# Patient Record
Sex: FEMALE | Race: WHITE | NOT HISPANIC OR LATINO | Employment: FULL TIME | URBAN - METROPOLITAN AREA
[De-identification: names, ages, dates, MRNs, and addresses within clinical notes are randomized per-mention and may not be internally consistent; named-entity substitution may affect disease eponyms.]

---

## 2017-12-11 ENCOUNTER — OFFICE VISIT (OUTPATIENT)
Dept: ENDOCRINOLOGY | Facility: CLINIC | Age: 47
End: 2017-12-11

## 2017-12-11 VITALS
HEART RATE: 90 BPM | HEIGHT: 63 IN | WEIGHT: 157.7 LBS | BODY MASS INDEX: 27.94 KG/M2 | SYSTOLIC BLOOD PRESSURE: 137 MMHG | DIASTOLIC BLOOD PRESSURE: 92 MMHG | OXYGEN SATURATION: 100 %

## 2017-12-11 DIAGNOSIS — E66.3 OVERWEIGHT (BMI 25.0-29.9): Primary | ICD-10-CM

## 2017-12-11 RX ORDER — PHENTERMINE HYDROCHLORIDE 30 MG/1
30 CAPSULE ORAL EVERY MORNING
Qty: 30 CAPSULE | Refills: 3 | Status: SHIPPED | OUTPATIENT
Start: 2017-12-11 | End: 2024-02-28

## 2017-12-11 ASSESSMENT — ENCOUNTER SYMPTOMS
INSOMNIA: 0
WEIGHT LOSS: 0
DECREASED CONCENTRATION: 0
NIGHT SWEATS: 1
FATIGUE: 1
DECREASED APPETITE: 0
INCREASED ENERGY: 1
POLYPHAGIA: 1
FEVER: 0
NERVOUS/ANXIOUS: 0
HALLUCINATIONS: 0
CHILLS: 0
DEPRESSION: 1
ALTERED TEMPERATURE REGULATION: 0
WEIGHT GAIN: 1
POLYDIPSIA: 0
PANIC: 0

## 2017-12-11 ASSESSMENT — PAIN SCALES - GENERAL: PAINLEVEL: NO PAIN (0)

## 2017-12-11 NOTE — LETTER
Date:December 12, 2017      Patient was self referred, no letter generated. Do not send.        [facility] Health Information
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
## 2017-12-11 NOTE — NURSING NOTE
"(   Chief Complaint   Patient presents with     Consult     weight man.     )    ( Weight: 157 lb 11.2 oz )  ( Height: 5' 3\" )  ( BMI (Calculated): 27.99 )  (   )  (   )  (   )  (   )  (   )  (   )    ( BP: (!) 137/92 )  (   )  (   )  (   )  ( Pulse: 90 )  (   )  ( SpO2: 100 % )    (   Patient Active Problem List   Diagnosis     FAMILIA (generalized anxiety disorder)    )  (   Current Outpatient Prescriptions   Medication Sig Dispense Refill     FLUoxetine (PROZAC) 20 MG capsule TK ONE C PO QD TK AFTER 10 MG T       SPRINTEC 28 OR None Entered       LEVAQUIN 500 MG OR TABS ONE DAILY 14 0     EFFEXOR XR# 150 MG OR CP24 1 CAPSULE DAILY WITH FOOD       XANAX 0.5 MG OR TABS PRN       VICKEY 3-0.02 MG OR TABS 1 TABLET DAILY       PHENTERCOT 30 MG OR CAPS 1 CAPSULE EVERY MORNING 30 0    )  ( Diabetes Eval:    )    ( Pain Eval:  No Pain (0) )    ( Wound Eval:       )    (   History   Smoking Status     Never Smoker   Smokeless Tobacco     Not on file    )    ( Signed By:  Brendon Hinson; December 11, 2017; 11:27 AM )    "

## 2017-12-11 NOTE — MR AVS SNAPSHOT
After Visit Summary   2017    Aga Savage    MRN: [id number]           Patient Information     Date Of Birth          1970        Visit Information        Provider Department      2017 11:00 AM Daron Loaiza MD [facility] Medical Weight Management        Today's Diagnoses     Overweight (BMI 25.0-29.9)    -  1       Follow-ups after your visit        Follow-up notes from your care team     Return in about 3 months (around 3/11/2018).      Who to contact     Please call your clinic at [phone] to:    Ask questions about your health    Make or cancel appointments    Discuss your medicines    Learn about your test results    Speak to your doctor   If you have compliments or concerns about an experience at your clinic, or if you wish to file a complaint, please contact [facility] Patient Relations at [phone] or email us at [email or web address]         Additional Information About Your Visit        MyChart Information     [employer] is an electronic gateway that provides easy, online access to your medical records. With [employer], you can request a clinic appointment, read your test results, renew a prescription or communicate with your care team.     To sign up for [employer] visit the website at [email or web address]   You will be asked to enter the access code listed below, as well as some personal information. Please follow the directions to create your username and password.     Your access code is: [email or web address]  Expires: 2018  6:30 AM     Your access code will  in 90 days. If you need help or a new code, please contact your [facility] or call [phone] for assistance.        Care EveryWhere ID     This is your Care EveryWhere ID. This could be used by other organizations to access your [place] medical records  [id number]        Your Vitals Were     Pulse Height Pulse Oximetry  "BMI (Body Mass Index)          90 1.6 m (5' 3\") 100% 27.94 kg/m2         Blood Pressure from Last 3 Encounters:   12/11/17 (!) 137/92   03/13/09 122/70   09/05/08 124/76    Weight from Last 3 Encounters:   12/11/17 71.5 kg (157 lb 11.2 oz)   09/05/08 78 kg (172 lb)   07/22/08 82.6 kg (182 lb)              Today, you had the following     No orders found for display         Today's Medication Changes          These changes are accurate as of: 12/11/17 11:47 AM.  If you have any questions, ask your nurse or doctor.               These medicines have changed or have updated prescriptions.        Dose/Directions    * PHENTERCOT 30 MG Caps   This may have changed:  Another medication with the same name was added. Make sure you understand how and when to take each.   Used for:  Overweight (BMI 25.0-29.9)   Changed by:  Juanjo Fisher MD        1 CAPSULE EVERY MORNING   Quantity:  30   Refills:  0       * phentermine 30 MG capsule   This may have changed:  You were already taking a medication with the same name, and this prescription was added. Make sure you understand how and when to take each.   Used for:  Overweight (BMI 25.0-29.9)   Changed by:  Daron Loaiza MD        Dose:  30 mg   Take 1 capsule (30 mg) by mouth every morning   Quantity:  30 capsule   Refills:  3       * Notice:  This list has 2 medication(s) that are the same as other medications prescribed for you. Read the directions carefully, and ask your doctor or other care provider to review them with you.         Where to get your medicines      Some of these will need a paper prescription and others can be bought over the counter.  Ask your nurse if you have questions.     Bring a paper prescription for each of these medications     phentermine 30 MG capsule                Primary Care Provider    None Specified       No primary provider on file.        Equal Access to Services     ESME EID AH: lenny Baires " jj, tex vargas, jordan olvera. So [facility] [phone].    ATENCIÓN: Si diana luque, tiene a segura disposición servicios gratuitos de asistencia lingüística. Ashely al [phone].    We comply with applicable federal civil rights laws and [place] laws. We do not discriminate on the basis of race, color, national origin, age, disability, sex, sexual orientation, or gender identity.            Thank you!     Thank you for choosing [facility]ICAL WE[facility] MANAGEMENT  for your care. Our goal is always to provide you with excellent care. Hearing back from our patients is one way we can continue to improve our services. Please take a few minutes to complete the written survey that you may receive in the mail after your visit with us. Thank you!             Your Updated Medication List - Protect others around you: Learn how to safely use, store and throw away your medicines at www.disposemymeds.org.          This list is accurate as of: 12/11/17 11:47 AM.  Always use your most recent med list.                   Brand Name Dispense Instructions for use Diagnosis    EFFEXOR  MG 24 hr capsule   Generic drug:  venlafaxine      1 CAPSULE DAILY WITH FOOD        LEVAQUIN 500 MG tablet   Generic drug:  levofloxacin     14    ONE DAILY    Sinusitis acute       * PHENTERCOT 30 MG Caps     30    1 CAPSULE EVERY MORNING    Overweight (BMI 25.0-29.9)       * phentermine 30 MG capsule     30 capsule    Take 1 capsule (30 mg) by mouth every morning    Overweight (BMI 25.0-29.9)       PROZAC 20 MG capsule   Generic drug:  FLUoxetine      TK ONE C PO QD TK AFTER 10 MG T        SPRINTEC 28 PO      None Entered        XANAX 0.5 MG tablet   Generic drug:  ALPRAZolam      PRN        VICKEY 3-0.02 MG per tablet   Generic drug:  drospirenone-ethinyl estradiol      1 TABLET DAILY        * Notice:  This list has 2 medication(s) that are the same as other medications prescribed for you. Read the  directions carefully, and ask your doctor or other care provider to review them with you.
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
## 2018-06-15 ENCOUNTER — VIRTUAL VISIT (OUTPATIENT)
Dept: FAMILY MEDICINE | Facility: OTHER | Age: 48
End: 2018-06-15

## 2018-06-16 NOTE — PROGRESS NOTES
"Date: [id number]  Clinician: Armaan Ppoe  Clinician NPI: [id number]  Patient: Aga Savage  Patient : 1970  Patient Address: [address] [place] [address]  Patient Phone: [phone]  Visit Protocol: Yeast infection  Patient Summary:  Aga is a 48 year old ( : 1970 ) female who initiated a Visit for a presumed vaginal yeast infection. When asked the question \"Please sign me up to receive news, health information and promotions from [facility].\", Aga responded \"No\".    5-10 days ago, she began noticing perivulvar pruritus and vaginal discharge.   She denies having open sores, perivulvar rash, vaginal pruritus, and abdominal pain. She also denies feeling feverish.   She has had one (1) occurrence in the past year and the current symptoms are similar to previous yeast infections. She has not tried to treat her current symptoms with any medication.   She has a more than normal amount of chunky (like cottage cheese), clear or white, thick, non-odorous discharge.   She denies taking antibiotics in the past 2 weeks. She prefers a fluconazole (Diflucan) Pill.   She denies pregnancy and denies breastfeeding. She has menstruated in the past month.   She denies risk factors for sexually transmitted infections. She does NOT smoke or use smokeless tobacco.   MEDICATIONS: [{\"id\"=&gt;5829878, \"description\"=&gt;\"Xanax oral\"}, {\"id\"=&gt;2917997, \"description\"=&gt;\"Zantac oral\"}, {\"id\"=&gt;8854716, \"description\"=&gt;\"Adderall oral\"}, {\"id\"=&gt;4210754, \"description\"=&gt;\"Prozac oral\"}], ALLERGIES: [{\"id\"=&gt;3338797, \"description\"=&gt;\"Penicillins\"}, {\"id\"=&gt;8502303, \"description\"=&gt;\"Septra\"}, {\"id\"=&gt;9884328, \"description\"=&gt;\"Celexa\"}]  Clinician Response:  Dear Aga,  Based on the information you have provided, you likely have a vaginal yeast infection which is a common infection of the vagina caused by a fungus.  I am prescribing:   Fluconazole (Diflucan) 150 mg oral tablet. Take 1 tablet by " mouth 1 time per day for 1 day. There are no refills with this prescription.  While you have yeast infection symptoms, do the following:      Avoid irritants such as scented bath products, tampons, pads, or vaginal sprays and powders.    Avoid douching.    Wear cotton underwear and if you are comfortable doing so, do not wear underwear to bed.    Avoid hot tubs and whirlpool spas.     Most women notice improvement in their symptoms within 1-2 days after starting treatment with complete clearing in 5-7 days. If your symptoms have not improved in 3 days or not resolved in 10 days, please schedule an appointment to see your primary care provider for an evaluation as your symptoms may be caused by an infection other than yeast. Sometimes yeast infections can be associated with other vaginal infections or with sexually transmitted infections (STIs). If you think you may be at risk for a STI please be seen in a clinic.   Diagnosis: Candida Vulvovaginitis  Diagnosis ICD: B37.3  Prescription: fluconazole (Diflucan) 150 mg oral tablet 1 1 tablet blister pack, 1 days supply. Take 1 tablet by mouth 1 time per day for 1 day. Refills: 0, Refill as needed: no, Allow substitutions: yes  Pharmacy: [id number] - [phone] - [address] [address]

## 2018-06-17 ENCOUNTER — HOSPITAL ENCOUNTER (EMERGENCY)
Facility: CLINIC | Age: 48
Discharge: LEFT BEFORE DISCHARGE | End: 2018-06-17
Payer: COMMERCIAL

## 2018-06-17 VITALS
TEMPERATURE: 99 F | DIASTOLIC BLOOD PRESSURE: 87 MMHG | OXYGEN SATURATION: 100 % | RESPIRATION RATE: 20 BRPM | SYSTOLIC BLOOD PRESSURE: 145 MMHG | HEART RATE: 109 BPM

## 2018-06-17 NOTE — ED TRIAGE NOTES
Pt to ER with c/o needing an note to be able to go back to work, also would like her ears and throat checked

## 2018-11-28 ENCOUNTER — VIRTUAL VISIT (OUTPATIENT)
Dept: FAMILY MEDICINE | Facility: OTHER | Age: 48
End: 2018-11-28

## 2018-11-29 NOTE — PROGRESS NOTES
"Date:   Clinician: Gustavo De Leon  Clinician NPI: [id number]  Patient: Aga Savage  Patient : 1970  Patient Address: [address] [place] [address]  Patient Phone: [phone]  Visit Protocol: Yeast infection  Patient Summary:  Aga is a 48 year old ( : 1970 ) female who initiated a Visit for a presumed vaginal yeast infection. When asked the question \"Please sign me up to receive news, health information and promotions from [employer].\", Aga responded \"No\".    Aga began noticing vaginal discharge and perivulvar pruritus 0-5 days ago. She has a more than normal amount of thick, clear or white, non-odorous, smooth discharge.   She denies having abdominal pain, vaginal pruritus, perivulvar rash, and open sores. She also denies feeling feverish.   Aga has a history of vaginal yeast infections. She has had one (1) occurrence in the past year and the symptoms are NOT similar to previous yeast infections. She has used fluconazole (Diflucan) to treat previous yeast infections. 2 doses of fluconazole (Diflucan) has typically been needed for symptoms to resolve in the past.  She has not tried to treat her current symptoms with any medication.   She prefers a fluconazole (Diflucan) Pill.   She denies taking antibiotics in the past 2 weeks. She denies having a sexually transmitted disease.   She denies pregnancy and denies breastfeeding. She has menstruated in the past month.   She does NOT smoke or use smokeless tobacco.   Additional information as reported by the patient (free text): This is actually a bacterial infection. I have clear discharge and it is the same symptoms as before. May I please get the gel for a bacterial infection called in. I have a history of bv.Thank you     MEDICATIONS: Xanax oral, Zantac oral, Adderall oral, Prozac oral, ALLERGIES: Celexa, Septra, Penicillins  Clinician Response:  Dear Aga,  Based on the information you have provided, you likely have a vaginal " yeast infection which is a common infection of the vagina caused by a fungus.  I am prescribing:   Fluconazole (Diflucan) 150 mg oral tablet. Take 1 tablet by mouth in a single dose. Repeat dose in 3 days if symptoms are still present. There are no refills with this prescription.  While you have yeast infection symptoms, do the following:      Avoid irritants such as scented bath products, tampons, pads, or vaginal sprays and powders.    Avoid douching.    Wear cotton underwear and if you are comfortable doing so, do not wear underwear to bed.    Avoid hot tubs and whirlpool spas.     Symptoms should improve with 1-2 days of treatment and resolve entirely in 5-7 days. However, there are other types of vaginal infections that have some of the same symptoms as a yeast infection. For this reason, please be seen in person if symptoms have not improved after 3 days or resolved after 10 days.   Diagnosis: Candida vulvovaginitis  Diagnosis ICD: B37.3  Prescription: fluconazole (Diflucan) 150 mg oral tablet 2 tablet, 4 days supply. Take 1 tablet by mouth in a single dose, repeat dose in 3 days if symptoms are still present. Refills: 0, Refill as needed: no, Allow substitutions: yes

## 2021-05-26 ENCOUNTER — RECORDS - HEALTHEAST (OUTPATIENT)
Dept: ADMINISTRATIVE | Facility: CLINIC | Age: 51
End: 2021-05-26

## 2024-06-04 ENCOUNTER — TRANSFERRED RECORDS (OUTPATIENT)
Dept: MULTI SPECIALTY CLINIC | Facility: CLINIC | Age: 54
End: 2024-06-04

## 2024-12-23 ENCOUNTER — OFFICE VISIT (OUTPATIENT)
Dept: FAMILY MEDICINE | Facility: CLINIC | Age: 54
End: 2024-12-23
Payer: COMMERCIAL

## 2024-12-23 VITALS
OXYGEN SATURATION: 99 % | WEIGHT: 167.2 LBS | TEMPERATURE: 97.2 F | RESPIRATION RATE: 20 BRPM | HEIGHT: 64 IN | SYSTOLIC BLOOD PRESSURE: 130 MMHG | DIASTOLIC BLOOD PRESSURE: 91 MMHG | BODY MASS INDEX: 28.54 KG/M2 | HEART RATE: 92 BPM

## 2024-12-23 DIAGNOSIS — Z11.59 NEED FOR HEPATITIS C SCREENING TEST: ICD-10-CM

## 2024-12-23 DIAGNOSIS — R09.81 CHRONIC NASAL CONGESTION: ICD-10-CM

## 2024-12-23 DIAGNOSIS — Z76.89 ENCOUNTER TO ESTABLISH CARE: Primary | ICD-10-CM

## 2024-12-23 DIAGNOSIS — K13.0 ANGULAR CHEILITIS: ICD-10-CM

## 2024-12-23 DIAGNOSIS — L98.7 EXCESS SKIN OF ABDOMEN: ICD-10-CM

## 2024-12-23 DIAGNOSIS — Z13.6 CARDIOVASCULAR SCREENING; LDL GOAL LESS THAN 130: ICD-10-CM

## 2024-12-23 DIAGNOSIS — B37.0 CANDIDIASIS OF MOUTH: ICD-10-CM

## 2024-12-23 DIAGNOSIS — B49 FUNGAL INFECTION: ICD-10-CM

## 2024-12-23 DIAGNOSIS — Z11.4 SCREENING FOR HIV (HUMAN IMMUNODEFICIENCY VIRUS): ICD-10-CM

## 2024-12-23 DIAGNOSIS — Z13.29 SCREENING FOR THYROID DISORDER: ICD-10-CM

## 2024-12-23 DIAGNOSIS — K21.00 GASTROESOPHAGEAL REFLUX DISEASE WITH ESOPHAGITIS, UNSPECIFIED WHETHER HEMORRHAGE: ICD-10-CM

## 2024-12-23 DIAGNOSIS — N95.1 SYMPTOMATIC MENOPAUSAL OR FEMALE CLIMACTERIC STATES: ICD-10-CM

## 2024-12-23 DIAGNOSIS — Z79.899 MEDICATION MANAGEMENT: ICD-10-CM

## 2024-12-23 DIAGNOSIS — Z13.1 SCREENING FOR DIABETES MELLITUS: ICD-10-CM

## 2024-12-23 DIAGNOSIS — Z12.11 SCREEN FOR COLON CANCER: ICD-10-CM

## 2024-12-23 DIAGNOSIS — E61.1 IRON DEFICIENCY: ICD-10-CM

## 2024-12-23 DIAGNOSIS — L81.1 MELASMA: ICD-10-CM

## 2024-12-23 LAB
ALBUMIN SERPL BCG-MCNC: 4.2 G/DL (ref 3.5–5.2)
ALP SERPL-CCNC: 124 U/L (ref 40–150)
ALT SERPL W P-5'-P-CCNC: 18 U/L (ref 0–50)
ANION GAP SERPL CALCULATED.3IONS-SCNC: 9 MMOL/L (ref 7–15)
AST SERPL W P-5'-P-CCNC: 24 U/L (ref 0–45)
BILIRUB SERPL-MCNC: 0.2 MG/DL
BUN SERPL-MCNC: 12.3 MG/DL (ref 6–20)
CALCIUM SERPL-MCNC: 9.3 MG/DL (ref 8.8–10.4)
CHLORIDE SERPL-SCNC: 103 MMOL/L (ref 98–107)
CHOLEST SERPL-MCNC: 212 MG/DL
CREAT SERPL-MCNC: 0.86 MG/DL (ref 0.51–0.95)
EGFRCR SERPLBLD CKD-EPI 2021: 81 ML/MIN/1.73M2
ERYTHROCYTE [DISTWIDTH] IN BLOOD BY AUTOMATED COUNT: 15 % (ref 10–15)
EST. AVERAGE GLUCOSE BLD GHB EST-MCNC: 100 MG/DL
FASTING STATUS PATIENT QL REPORTED: YES
FASTING STATUS PATIENT QL REPORTED: YES
FERRITIN SERPL-MCNC: 16 NG/ML (ref 11–328)
GLUCOSE SERPL-MCNC: 99 MG/DL (ref 70–99)
HBA1C MFR BLD: 5.1 % (ref 0–5.6)
HCO3 SERPL-SCNC: 27 MMOL/L (ref 22–29)
HCT VFR BLD AUTO: 39.1 % (ref 35–47)
HCV AB SERPL QL IA: NONREACTIVE
HDLC SERPL-MCNC: 61 MG/DL
HGB BLD-MCNC: 12.5 G/DL (ref 11.7–15.7)
HIV 1+2 AB+HIV1 P24 AG SERPL QL IA: NONREACTIVE
IRON BINDING CAPACITY (ROCHE): 318 UG/DL (ref 240–430)
IRON SATN MFR SERPL: 8 % (ref 15–46)
IRON SERPL-MCNC: 26 UG/DL (ref 37–145)
LDLC SERPL CALC-MCNC: 130 MG/DL
MCH RBC QN AUTO: 28 PG (ref 26.5–33)
MCHC RBC AUTO-ENTMCNC: 32 G/DL (ref 31.5–36.5)
MCV RBC AUTO: 88 FL (ref 78–100)
NONHDLC SERPL-MCNC: 151 MG/DL
PLATELET # BLD AUTO: 327 10E3/UL (ref 150–450)
POTASSIUM SERPL-SCNC: 3.7 MMOL/L (ref 3.4–5.3)
PROT SERPL-MCNC: 7 G/DL (ref 6.4–8.3)
RBC # BLD AUTO: 4.46 10E6/UL (ref 3.8–5.2)
SODIUM SERPL-SCNC: 139 MMOL/L (ref 135–145)
TRIGL SERPL-MCNC: 104 MG/DL
TSH SERPL DL<=0.005 MIU/L-ACNC: 1.52 UIU/ML (ref 0.3–4.2)
WBC # BLD AUTO: 9.6 10E3/UL (ref 4–11)

## 2024-12-23 PROCEDURE — 99204 OFFICE O/P NEW MOD 45 MIN: CPT

## 2024-12-23 PROCEDURE — 83036 HEMOGLOBIN GLYCOSYLATED A1C: CPT

## 2024-12-23 PROCEDURE — 83550 IRON BINDING TEST: CPT

## 2024-12-23 PROCEDURE — 83540 ASSAY OF IRON: CPT

## 2024-12-23 PROCEDURE — 82728 ASSAY OF FERRITIN: CPT

## 2024-12-23 PROCEDURE — 84443 ASSAY THYROID STIM HORMONE: CPT

## 2024-12-23 PROCEDURE — 36415 COLL VENOUS BLD VENIPUNCTURE: CPT

## 2024-12-23 PROCEDURE — 80053 COMPREHEN METABOLIC PANEL: CPT

## 2024-12-23 PROCEDURE — 87389 HIV-1 AG W/HIV-1&-2 AB AG IA: CPT

## 2024-12-23 PROCEDURE — 86803 HEPATITIS C AB TEST: CPT

## 2024-12-23 PROCEDURE — 80061 LIPID PANEL: CPT

## 2024-12-23 PROCEDURE — 85027 COMPLETE CBC AUTOMATED: CPT

## 2024-12-23 RX ORDER — PIMECROLIMUS 10 MG/G
CREAM TOPICAL
COMMUNITY
Start: 2023-03-20

## 2024-12-23 RX ORDER — HYDROQUINONE 40 MG/G
CREAM TOPICAL
Qty: 30 ML | Refills: 0 | Status: SHIPPED | OUTPATIENT
Start: 2024-12-23

## 2024-12-23 RX ORDER — TRETINOIN 0.25 MG/G
CREAM TOPICAL AT BEDTIME
Qty: 45 G | Refills: 3 | Status: SHIPPED | OUTPATIENT
Start: 2024-12-23

## 2024-12-23 RX ORDER — ESTRADIOL 0.1 MG/G
2 CREAM VAGINAL
Qty: 42.5 G | Refills: 4 | Status: SHIPPED | OUTPATIENT
Start: 2024-12-23

## 2024-12-23 RX ORDER — KETOCONAZOLE 20 MG/G
CREAM TOPICAL
COMMUNITY
Start: 2023-09-25

## 2024-12-23 RX ORDER — DEXTROAMPHETAMINE SACCHARATE, AMPHETAMINE ASPARTATE, DEXTROAMPHETAMINE SULFATE AND AMPHETAMINE SULFATE 7.5; 7.5; 7.5; 7.5 MG/1; MG/1; MG/1; MG/1
30 TABLET ORAL
COMMUNITY
Start: 2024-12-06

## 2024-12-23 RX ORDER — DESVENLAFAXINE 100 MG/1
100 TABLET, EXTENDED RELEASE ORAL
COMMUNITY
Start: 2024-09-20

## 2024-12-23 RX ORDER — BUSPIRONE HYDROCHLORIDE 10 MG/1
10 TABLET ORAL
COMMUNITY
Start: 2024-10-18

## 2024-12-23 RX ORDER — ALPRAZOLAM 1 MG/1
1 TABLET ORAL
COMMUNITY
Start: 2024-10-18

## 2024-12-23 RX ORDER — ESZOPICLONE 2 MG/1
1 TABLET, FILM COATED ORAL AT BEDTIME
COMMUNITY
Start: 2024-10-20

## 2024-12-23 RX ORDER — NYSTATIN 100000 [USP'U]/G
POWDER TOPICAL 2 TIMES DAILY PRN
Qty: 60 G | Refills: 3 | Status: SHIPPED | OUTPATIENT
Start: 2024-12-23

## 2024-12-23 RX ORDER — NYSTATIN 100000 [USP'U]/ML
500000 SUSPENSION ORAL 4 TIMES DAILY
Qty: 473 ML | Refills: 0 | Status: SHIPPED | OUTPATIENT
Start: 2024-12-23

## 2024-12-23 SDOH — HEALTH STABILITY: PHYSICAL HEALTH: ON AVERAGE, HOW MANY MINUTES DO YOU ENGAGE IN EXERCISE AT THIS LEVEL?: 100 MIN

## 2024-12-23 SDOH — HEALTH STABILITY: PHYSICAL HEALTH: ON AVERAGE, HOW MANY DAYS PER WEEK DO YOU ENGAGE IN MODERATE TO STRENUOUS EXERCISE (LIKE A BRISK WALK)?: 5 DAYS

## 2024-12-23 ASSESSMENT — PAIN SCALES - GENERAL: PAINLEVEL_OUTOF10: NO PAIN (0)

## 2024-12-23 ASSESSMENT — SOCIAL DETERMINANTS OF HEALTH (SDOH): HOW OFTEN DO YOU GET TOGETHER WITH FRIENDS OR RELATIVES?: ONCE A WEEK

## 2024-12-23 NOTE — PROGRESS NOTES
Preventive Care Visit  [facility]  Cynthia Ventura DNP, Geriatric Medicine  Dec 23, 2024  {Provider  Link to [facility] :710663}    {PROVIDER CHARTING PREFERENCE:427590}    Gold Yung is a 54 year old, presenting for the following:  Establish Care and Physical        12/23/2024    11:04 AM   Additional Questions   Roomed by Marisela SWANSON MA        Via the Health Maintenance questionnaire, the patient has reported the following services have been completed -Mammogram: Ирина 2024-05-01, this information has been sent to the abstraction team.    History of Present Illness       Reason for visit:  Blood work, check tongue, check lump under arm  Symptom onset:  More than a month  Symptom intensity:  Moderate  Symptom progression:  Staying the same  Had these symptoms before:  No   She is taking medications regularly.            Health Care Directive  Patient does not have a Health Care Directive: Discussed advance care planning with patient; however, patient declined at this time.      12/23/2024   General Health   How would you rate your overall physical health? (!) FAIR   Feel stress (tense, anxious, or unable to sleep) Very much   (!) STRESS CONCERN      12/23/2024   Nutrition   Three or more servings of calcium each day? (!) NO   Diet: Regular (no restrictions)   How many servings of fruit and vegetables per day? (!) 0-1   How many sweetened beverages each day? (!) 2         12/23/2024   Exercise   Days per week of moderate/strenous exercise 5 days   Average minutes spent exercising at this level 100 min         12/23/2024   Social Factors   Frequency of gathering with friends or relatives Once a week   Worry food won't last until get money to buy more No   Food not last or not have enough money for food? No   Do you have housing? (Housing is defined as stable permanent housing and does not include staying ouside in a car, in a tent, in an abandoned building, in an overnight shelter, or  couch-surfing.) Yes   Are you worried about losing your housing? No   Lack of transportation? No   Unable to get utilities (heat,electricity)? No         12/23/2024   Fall Risk   Fallen 2 or more times in the past year? No   Trouble with walking or balance? No          12/23/2024   Dental   Dentist two times every year? (!) DECLINE         12/23/2024   TB Screening   Were you born outside of the [place]? No       {Rooming Staff Patient needs a PHQ as part of the AWV.  Use this link to complete and then refresh the note to pull results Link to PHQ2 Assessment :735830}    Today's PHQ-2 Score:       12/23/2024    11:09 AM   PHQ-2 ( 1999 [employer])   Q1: Little interest or pleasure in doing things 1   Q2: Feeling down, depressed or hopeless 1   PHQ-2 Score 2         12/23/2024   Substance Use   Alcohol more than 3/day or more than 7/wk No   Do you use any other substances recreationally? No     Social History     Tobacco Use    Smoking status: Never   Substance Use Topics    Alcohol use: Yes     Comment: rare    Drug use: No     {Provider  If there are gaps in the social history shown above, please follow the link to update and then refresh the note Link to Social and Substance History :557199}     {Mammogram Decision Support (Optional):327617}          12/23/2024   One time HIV Screening   Previous HIV test? Yes         12/23/2024   STI Screening   New sexual partner(s) since last STI/HIV test? (!) YES ***     History of abnormal Pap smear: { :770318}       ASCVD Risk   The ASCVD Risk score (Saúl HAIDER, et al., 2019) failed to calculate for the following reasons:    The systolic blood pressure is missing    Cannot find a previous HDL lab    Cannot find a previous total cholesterol lab    {Link to Fracture Risk Assessment Tool (Optional):741188}    {Provider  REQUIRED FOR AWV Use the storyboard to review patient history, after sections have been marked as reviewed, refresh note to capture documentation:877055}  "  Reviewed and updated as needed this visit by Provider     Meds                {HISTORY OPTIONS (Optional):320474}    {ROS Picklists (Optional):313367}     Objective    Exam  There were no vitals taken for this visit.   Estimated body mass index is 27.94 kg/m  as calculated from the following:    Height as of 12/11/17: 1.6 m (5' 3\").    Weight as of 12/11/17: 71.5 kg (157 lb 11.2 oz).    Physical Exam  Vitals reviewed.   Constitutional:       Appearance: Normal appearance.   HENT:      Head: Normocephalic.   Eyes:      Pupils: Pupils are equal, round, and reactive to light.   Cardiovascular:      Rate and Rhythm: Normal rate.   Pulmonary:      Effort: Pulmonary effort is normal. No respiratory distress.   Musculoskeletal:         General: Normal range of motion.   Skin:     General: Skin is warm.   Neurological:      Mental Status: She is alert and oriented to person, place, and time.   Psychiatric:         Mood and Affect: Mood normal.         Behavior: Behavior normal.         Thought Content: Thought content normal.         Judgment: Judgment normal.       {Exam Choices (Optional):717951}        Signed Electronically by: Cynthia Ventura DNP  {Email feedback regarding this note to [email or web address]   :527247}  "

## 2024-12-24 NOTE — PROGRESS NOTES
Assessment & Plan     Encounter to establish care  Looking to transfer care from outside organization    [place]  Previously worked with dermatology, wanting to try tretinoin and hydroquinone as other topicals have not been successful in treating symptoms. Discussed admin instructions, advised follow-up with derm for ongoing management  - tretinoin (RETIN-A) 0.025 % external cream; Apply topically at bedtime.  - hydroquinone (JOSE M) 4 % external cream; Apply a thin layer to the affected areas once daily at night.  - Adult Dermatology  Referral; Future    Chronic nasal congestion  Hx of chronic nasal congestion, advised trial of Flonase to see if this helps symptoms. Wanting to follow-up with ENT so will provide referral to establish care  - Adult ENT  Referral; Future    Angular cheilitis  Has tried and failed antifungals and zinc oxide, wanting to try alternative remedies. Will refer to dermatology for further management  - Adult Dermatology  Referral; Future    Excess skin of abdomen  Fungal infection  Hx of weight loss since pregnancy years ago, interested in plastic surgery referral for skin removal. Continues to get recurrent skin irritation   - nystatin (MYCOSTATIN) 218488 UNIT/GM external powder; Apply topically 2 times daily as needed for dry skin.  - Adult Plastic Surgery  Referral; Future    Gastroesophageal reflux disease with esophagitis, unspecified whether hemorrhage  Hx of ongoing reflux for many years, has tried PPI in the past with success but wanting to further evaluate prior to continuing with medication therapy  - Adult GI  Referral - Procedure Only; Future    Symptomatic menopausal or female climacteric states  Reports some vaginal irritation, will trial vaginal estrogren cream to see if this improves symptoms.  - estradiol (ESTRACE) 0.1 MG/GM vaginal cream; Place 2 g vaginally twice a week.    Iron deficiency  Reports hx of iron deficiency,  "wanting to get labs rechecked  - CBC with platelets  - Ferritin  - Iron & Iron Binding Capacity    Screen for colon cancer  Due for colon cancer screening, will provide referral  - Colonoscopy Screening  Referral; Future    Screening for HIV (human immunodeficiency virus)  - HIV Antigen Antibody Combo    Need for hepatitis C screening test  - Hepatitis C Screen Reflex to HCV RNA Quant and Genotype    Candidiasis of mouth  Not evident on physical exam but reports some recurrent/intermittent \"white\" patches on back of tongue, reports dry mouth with adderall. Can trial PO nystatin swish & spit to see if symptoms improved  - nystatin (MYCOSTATIN) 768091 UNIT/ML suspension; Take 5 mLs (500,000 Units) by mouth 4 times daily.    Screening for diabetes mellitus  - Hemoglobin A1c    Screening for thyroid disorder  - TSH with free T4 reflex    CARDIOVASCULAR SCREENING; LDL GOAL LESS THAN 130  - Lipid panel reflex to direct LDL Non-fasting    Medication management  - Comprehensive metabolic panel    Patient has been advised of split billing requirements and indicates understanding: Yes        BMI  Estimated body mass index is 29.15 kg/m  as calculated from the following:    Height as of this encounter: 1.613 m (5' 3.5\").    Weight as of this encounter: 75.8 kg (167 lb 3.2 oz).       Counseling  Appropriate preventive services were addressed with this patient via screening, questionnaire, or discussion as appropriate for fall prevention, nutrition, physical activity, Tobacco-use cessation, social engagement, weight loss and cognition.  Checklist reviewing preventive services available has been given to the patient.  Reviewed patient's diet, addressing concerns and/or questions.   She is at risk for psychosocial distress and has been provided with information to reduce risk.       FUTURE APPOINTMENTS:       - Follow-up for annual visit or as needed    Gold Yung is a 54 year old, presenting for the following " "health issues:  Establish Care and Physical        12/23/2024    11:04 AM   Additional Questions   Roomed by Marisela SWANSON MA     Here to establish care and update labs and referrals  Reports new relationship, interested in options to help with vaginal discomfort  Ongoing GERD, omeprazole has been effective in the past in managing symptoms  Has seen derm for skin concerns  Works with psychiatry for Adderall   Wanting second opinion for plastic surgery  Works as  at [email or web address]    Healthy Habits:     Taking medications regularly:  0  History of Present Illness       Reason for visit:  Blood work, check tongue, check lump under arm  Symptom onset:  More than a month  Symptom intensity:  Moderate  Symptom progression:  Staying the same  Had these symptoms before:  No   She is taking medications regularly.                 Review of Systems  Constitutional, HEENT, cardiovascular, pulmonary, gi and gu systems are negative, except as otherwise noted.      Objective    BP (!) 130/91 (BP Location: Left arm, Patient Position: Sitting, Cuff Size: Adult Regular)   Pulse 92   Temp 97.2  F (36.2  C) (Temporal)   Resp 20   Ht 1.613 m (5' 3.5\")   Wt 75.8 kg (167 lb 3.2 oz)   SpO2 99%   BMI 29.15 kg/m    Body mass index is 29.15 kg/m .  Physical Exam  Vitals reviewed.   Constitutional:       Appearance: Normal appearance.   HENT:      Head: Normocephalic.   Eyes:      Pupils: Pupils are equal, round, and reactive to light.   Cardiovascular:      Rate and Rhythm: Normal rate.   Pulmonary:      Effort: Pulmonary effort is normal. No respiratory distress.   Musculoskeletal:         General: Normal range of motion.   Skin:     General: Skin is warm.   Neurological:      Mental Status: She is alert and oriented to person, place, and time.   Psychiatric:         Mood and Affect: Mood normal.         Behavior: Behavior normal.         Thought Content: Thought content normal.         Judgment: Judgment normal.                50 " minutes spent on the date of encounter doing chart review, history and exam, documentation, and further activities per the note.          Signed Electronically by: Cynthia Ventura, DNP, APRN, CNP

## 2024-12-26 NOTE — RESULT ENCOUNTER NOTE
"Hi Aga,    Overall labs look ok. However, your iron levels are low. I would recommend starting \"Slow Fe\" iron supplement over-the-counter every other day to help reduce some common GI side effects (nausea, flatulence, GI upset). Consuming iron with vitamin C can improve absorption.     We can further discuss labs at your physical if you have any questions!  Cynthia Ventura, DNP, APRN, CNP  "

## 2024-12-27 ENCOUNTER — HOSPITAL ENCOUNTER (OUTPATIENT)
Facility: CLINIC | Age: 54
End: 2024-12-27
Attending: INTERNAL MEDICINE | Admitting: INTERNAL MEDICINE
Payer: COMMERCIAL

## 2024-12-30 NOTE — TELEPHONE ENCOUNTER
FUTURE VISIT INFORMATION      FUTURE VISIT INFORMATION:  Date: 3/10/25  Time: 9:00am  Location: [place]  REFERRAL INFORMATION:  Referring provider:  Cynthia Ventura DNP   Referring providers clinic:  [facility]  Reason for visit/diagnosis  Excess skin of abdomen     RECORDS REQUESTED FROM:       Clinic name Comments Records Status Imaging Status   [facility] Ov/referral 12/23/24 epic

## 2025-01-14 ENCOUNTER — MYC MEDICAL ADVICE (OUTPATIENT)
Dept: GASTROENTEROLOGY | Facility: CLINIC | Age: 55
End: 2025-01-14
Payer: COMMERCIAL

## 2025-01-14 NOTE — TELEPHONE ENCOUNTER
Bowel Prep Review:  Disclaimer: No call was made to the patient.     Standard Miralax bowel prep.    Recommended due to standard bowel prep.   Instructions were sent via Switchcam.     Case scheduled with Dr. Bailey at [facility]. Centralized pre assessment team is not currently handling pre assessment for cases scheduled with Dr. Bailey.  If patient calls for pre assessment or prep review, call should be directed to [facility] pre assessment line at [phone].      Kellen Singh RN  Endoscopy Procedure Pre Assessment

## 2025-01-28 ENCOUNTER — TELEPHONE (OUTPATIENT)
Dept: GASTROENTEROLOGY | Facility: CLINIC | Age: 55
End: 2025-01-28
Payer: COMMERCIAL

## 2025-01-28 NOTE — TELEPHONE ENCOUNTER
Caller: Patient    Reason for Reschedule/Cancellation (please be detailed, any staff messages or encounters to note?):   Prep made her sick. Wants to go elsewhere for procedure.     Did you cancel or rescheduled an EUS procedure? No.    Is screening questionnaire older than 3 months from the reschedule date.   If Yes, please complete screening questionnaire. No    Prior to reschedule please review:  Ordering Provider: Cynthia Ventura Determined: CS  Does patient have any ASC Exclusions, please identify?: No    Notes on Cancelled Procedure:  Procedure: Upper and Lower Endoscopy [EGD and Colonoscopy]   Date: 1/28/2025  Location: [facility]; [facility] [place] [address] [place] [address]   Surgeon: Leo    Rescheduled: No, patient will go to another facility. CTL.

## 2025-02-19 ENCOUNTER — MYC MEDICAL ADVICE (OUTPATIENT)
Dept: FAMILY MEDICINE | Facility: CLINIC | Age: 55
End: 2025-02-19
Payer: COMMERCIAL

## 2025-02-19 NOTE — TELEPHONE ENCOUNTER
Provider, please read the patient My Chart message and advise the triage staff.     Lucina Luna RN  [facility]    
Anup [place]

## 2025-03-10 ENCOUNTER — OFFICE VISIT (OUTPATIENT)
Dept: OPHTHALMOLOGY | Facility: CLINIC | Age: 55
End: 2025-03-10
Payer: COMMERCIAL

## 2025-03-10 ENCOUNTER — PRE VISIT (OUTPATIENT)
Dept: OPHTHALMOLOGY | Facility: CLINIC | Age: 55
End: 2025-03-10

## 2025-03-10 DIAGNOSIS — H02.831 DERMATOCHALASIS OF BOTH UPPER EYELIDS: Primary | ICD-10-CM

## 2025-03-10 DIAGNOSIS — H02.403 PTOSIS OF BOTH EYELIDS: ICD-10-CM

## 2025-03-10 DIAGNOSIS — H02.834 DERMATOCHALASIS OF BOTH UPPER EYELIDS: Primary | ICD-10-CM

## 2025-03-10 PROCEDURE — 92082 INTERMEDIATE VISUAL FIELD XM: CPT | Mod: GC | Performed by: OPHTHALMOLOGY

## 2025-03-10 PROCEDURE — 92285 EXTERNAL OCULAR PHOTOGRAPHY: CPT | Mod: GC | Performed by: OPHTHALMOLOGY

## 2025-03-10 PROCEDURE — 99204 OFFICE O/P NEW MOD 45 MIN: CPT | Mod: GC | Performed by: OPHTHALMOLOGY

## 2025-03-10 ASSESSMENT — TONOMETRY
OS_IOP_MMHG: 20
IOP_METHOD: ICARE
OD_IOP_MMHG: 17

## 2025-03-10 ASSESSMENT — EXTERNAL EXAM - LEFT EYE: OS_EXAM: NORMAL

## 2025-03-10 ASSESSMENT — VISUAL ACUITY
OS_CC: 20/25
OS_CC+: -3
CORRECTION_TYPE: GLASSES
OD_CC+: -2
METHOD: SNELLEN - LINEAR
OD_CC: 20/20

## 2025-03-10 ASSESSMENT — MARGIN REFLEX DISTANCE
OS_MRD1: 2
OD_MRD1: 2

## 2025-03-10 ASSESSMENT — SLIT LAMP EXAM - LIDS
COMMENTS: DERMATOCHALASIS WITH EXCESS SKIN RESTING ON LASHES, TRUE PTOSIS
COMMENTS: DERMATOCHALASIS WITH EXCESS SKIN RESTING ON LASHES, TRUE PTOSIS

## 2025-03-10 ASSESSMENT — EXTERNAL EXAM - RIGHT EYE: OD_EXAM: NORMAL

## 2025-03-10 NOTE — PROGRESS NOTES
Chief Complaints and History of Present Illnesses   Patient presents with    Droopy Eye Lid Evaluation     Here for droopy eyelids each eye      Chief Complaint(s) and History of Present Illness(es)     Droopy Eye Lid Evaluation    Associated signs and symptoms include blurred vision.  Treatments tried   include manual elevation of lid.  Pain was noted as 0/10. Additional   comments: Here for droopy eyelids each eye     Comments    Over the last year she feels both upper eyelids have been drooping  Pt states her droopy eyelids are giving her poor peripheral vision.   She feels her lids are very heavy and hooded.   She has to lift her lids with her brow to see well.  Using AT PRN for dryness each eye     Kyle Ho 9:10 AM March 10, 2025       FUNCTIONAL COMPLAINTS RELATED TO DROOPY EYELIDS/BROWS:  Aga Savage describes upper lids interfering with superior visual field and interfering with activities of daily living including reading, driving and watching television.     EXAM:     MRD1: Right eye 2 mm   Left eye 2 mm  Dermatochalasis with excess skin touching eyelashes     VISUAL FIELD:  Right eye untaped:20 degrees Right eye taped:40 degrees  Left eye untaped:25 degrees Left eye taped:40 degrees    Right eye visual field improves by: 20 degrees  Left eye visual field improves by: 15 degrees    Assessment & Plan     Aga Savage is a 54 year old female with the following diagnoses:   1. Dermatochalasis of both upper eyelids    2. Ptosis of both eyelids       POH: dry eye  PMH: no blood thinners or pacemaker  Smoking: None    Bilateral upper blepharoplasty (skin)  Bilateral upper lids ptosis repair with MMCR 8/8    The heavy upper eyelids are causing symptoms as documented above. The condition is not secondary to underlying Sjogren's, myasthenia gravis, or polymyositis.           Twyla Medellin MD  Oculoplastic Surgery Fellow    Attending Physician Attestation:  I have seen and examined this patient with the  fellow .  I have confirmed and edited as necessary the chief complaint(s), history of present illness, review of systems, relevant history, and examination findings as documented by others.  I have personally reviewed the relevant tests, images, and reports as documented above.  I have confirmed and edited as necessary the assessment and plan and agree with this note.    - Mykel Koehler MD 10:02 AM 3/10/2025    Today with Aga Savage, I reviewed the indications, risks, benefits, and alternatives of the proposed surgical procedure including, but not limited to, failure obtain the desired result  and need for additional surgery, bleeding, infection, loss of vision, loss of the eye, and the remote possibility of permanent damage to any organ system or death with the use of anesthesia.  I provided multiple opportunities for the questions, answered all questions to the best of my ability, and confirmed that my answers and my discussion were understood.   - Mykel Koehler MD 10:02 AM 3/10/2025

## 2025-03-10 NOTE — NURSING NOTE
Chief Complaints and History of Present Illnesses   Patient presents with    Droopy Eye Lid Evaluation     Here for droopy eyelids each eye      Chief Complaint(s) and History of Present Illness(es)       Droopy Eye Lid Evaluation              Associated signs and symptoms: blurred vision    Treatments tried: manual elevation of lid    Pain scale: 0/10    Comments: Here for droopy eyelids each eye               Comments    Over the last year she feels both upper eyelids have been drooping  Pt states her droopy eyelids are giving her poor peripheral vision.   She feels her lids are very heavy and hooded.   She has to lift her lids with her brow to see well.  Using AT PRN for dryness each eye     Kyle Ho 9:10 AM March 10, 2025

## 2025-03-11 ENCOUNTER — TELEPHONE (OUTPATIENT)
Dept: OPHTHALMOLOGY | Facility: CLINIC | Age: 55
End: 2025-03-11
Payer: COMMERCIAL

## 2025-03-11 NOTE — TELEPHONE ENCOUNTER
Phone call and voicemail from patient about scheduling surgery .      Alex Solis on 3/11/2025 at 10:40 AM

## 2025-03-12 PROBLEM — H02.831 DERMATOCHALASIS OF BOTH UPPER EYELIDS: Status: ACTIVE | Noted: 2025-03-10

## 2025-03-12 PROBLEM — H02.403 PTOSIS OF BOTH EYELIDS: Status: ACTIVE | Noted: 2025-03-10

## 2025-03-12 PROBLEM — H02.834 DERMATOCHALASIS OF BOTH UPPER EYELIDS: Status: ACTIVE | Noted: 2025-03-10

## 2025-03-12 NOTE — TELEPHONE ENCOUNTER
Called patient to schedule surgery with DR Koehler    Spoke with: Aga (patient)    Date(s) of Surgery: 5/7/25    Patient aware of approximate arrival time: Yes      Tissue: No Ordered: No     Location of surgery: [facility]     Pre-Op H&P: [facility] Clinic at [facility]     Informed patient that they need to call to schedule pre-op H&P within 30 days of surgery date: Yes    Post-Op Appt Dates: 5/19 at 0830     Discussed with patient pre-op RN will call 2-3 days prior to surgery with arrival time and instructions:  Yes       Standard Surgery Packet Sent: Yes 03/12/25  via Audio Network Message      Additional Information Sent in Packet:  NA     Informed patient that they will need an adult  to bring patient home from surgery: Yes  : Father         Additional Comments:  NA      All patients questions were answered and was instructed to review surgical packet and call back [phone] with any questions or concerns.       Marichuy Bray on 3/12/2025 at 10:55 AM

## 2025-03-29 ENCOUNTER — E-VISIT (OUTPATIENT)
Dept: URGENT CARE | Facility: CLINIC | Age: 55
End: 2025-03-29
Payer: COMMERCIAL

## 2025-03-29 DIAGNOSIS — K13.70 LESION OF MOUTH: Primary | ICD-10-CM

## 2025-03-29 PROCEDURE — 99207 PR NON-BILLABLE SERV PER CHARTING: CPT | Performed by: NURSE PRACTITIONER

## 2025-03-30 NOTE — PATIENT INSTRUCTIONS
Dear Aga Savage?     After reviewing your responses, I am unable to make a diagnosis that can be treated online.    You will not be charged for this eVisit.     We are dedicated to helping you achieve your best health and would like to see you in one of our many clinic locations - a primary care provider would be ideal for your concern.    Please use Paperfold to schedule a visit with a provider or call [phone] ([phone]) to schedule at any of our locations.    Thanks for choosing?us?as your health care partner,?   ?   Elsy Stephenson, CNP?

## 2025-04-29 ENCOUNTER — OFFICE VISIT (OUTPATIENT)
Dept: INTERNAL MEDICINE | Facility: CLINIC | Age: 55
End: 2025-04-29
Payer: COMMERCIAL

## 2025-04-29 VITALS
OXYGEN SATURATION: 100 % | WEIGHT: 155 LBS | RESPIRATION RATE: 19 BRPM | SYSTOLIC BLOOD PRESSURE: 160 MMHG | TEMPERATURE: 97.6 F | HEART RATE: 95 BPM | DIASTOLIC BLOOD PRESSURE: 96 MMHG | BODY MASS INDEX: 26.46 KG/M2 | HEIGHT: 64 IN

## 2025-04-29 DIAGNOSIS — Z01.818 PREOP GENERAL PHYSICAL EXAM: Primary | ICD-10-CM

## 2025-04-29 DIAGNOSIS — F90.9 ATTENTION DEFICIT HYPERACTIVITY DISORDER (ADHD), UNSPECIFIED ADHD TYPE: ICD-10-CM

## 2025-04-29 DIAGNOSIS — H02.403 PTOSIS OF BOTH EYELIDS: ICD-10-CM

## 2025-04-29 DIAGNOSIS — H02.831 DERMATOCHALASIS OF BOTH UPPER EYELIDS: ICD-10-CM

## 2025-04-29 DIAGNOSIS — F41.1 GAD (GENERALIZED ANXIETY DISORDER): ICD-10-CM

## 2025-04-29 DIAGNOSIS — H02.834 DERMATOCHALASIS OF BOTH UPPER EYELIDS: ICD-10-CM

## 2025-04-29 PROCEDURE — 3080F DIAST BP >= 90 MM HG: CPT | Performed by: PHYSICIAN ASSISTANT

## 2025-04-29 PROCEDURE — 3077F SYST BP >= 140 MM HG: CPT | Performed by: PHYSICIAN ASSISTANT

## 2025-04-29 PROCEDURE — 1126F AMNT PAIN NOTED NONE PRSNT: CPT | Performed by: PHYSICIAN ASSISTANT

## 2025-04-29 PROCEDURE — 99214 OFFICE O/P EST MOD 30 MIN: CPT | Performed by: PHYSICIAN ASSISTANT

## 2025-04-29 ASSESSMENT — PAIN SCALES - GENERAL: PAINLEVEL_OUTOF10: NO PAIN (0)

## 2025-04-29 NOTE — PROGRESS NOTES
Preoperative Evaluation  [address] [address]  Phone: [phone]  Primary Provider: Cynthia Ventura DNP  Pre-op Performing Provider: Elsy Guardado PA-C  Apr 29, 2025 4/29/2025   Surgical Information   What procedure is being done? Upper bleph and ptosis surgery   Facility or Hospital where procedure/surgery will be performed: [facility]   Who is doing the procedure / surgery? Dr. Mykel Koehler   Date of surgery / procedure: 05/07/25   Time of surgery / procedure: Na   Where do you plan to recover after surgery? at home with family     Fax number for surgical facility: Note does not need to be faxed, will be available electronically in Epic.    Assessment & Plan     The proposed surgical procedure is considered LOW risk.    Preop general physical exam      Dermatochalasis of both upper eyelids      Ptosis of both eyelids      FAMILIA (generalized anxiety disorder)      Attention deficit hyperactivity disorder (ADHD), unspecified ADHD type              - No identified additional risk factors other than previously addressed    Preoperative Medication Instructions  Antiplatelet or Anticoagulation Medication Instructions   - We reviewed the medication list and the patient is not on an antiplatelet or anticoagulation medications.    Additional Medication Instructions  Take all scheduled medications on the day of surgery EXCEPT for modifications listed below:   - Benzodiazepines: Continue without modification.   - Psychostimulants: Hold the day of surgery   - SSRIs, SNRIs, TCAs, Antipsychotics: Continue without modification.   No creams or lotions on the face   and DO not take adderall the day of surgery     Recommendation  Approval given to proceed with proposed procedure, without further diagnostic evaluation.        Gold Yung is a 55 year old, presenting for the following:  Pre-Op Exam          4/29/2025     2:47  PM   Additional Questions   Roomed by Cynthia SERNA     HPI: ptosis eye lids           4/29/2025   Pre-Op Questionnaire   Have you ever had a heart attack or stroke? No   Have you ever had surgery on your heart or blood vessels, such as a stent placement, a coronary artery bypass, or surgery on an artery in your head, neck, heart, or legs? No   Do you have chest pain with activity? No   Do you have a history of heart failure? No   Do you currently have a cold, bronchitis or symptoms of other infection? No   Do you have a cough, shortness of breath, or wheezing? No   Do you or anyone in your family have previous history of blood clots? No   Do you or does anyone in your family have a serious bleeding problem such as prolonged bleeding following surgeries or cuts? No   Have you ever had problems with anemia or been told to take iron pills? (!) YES on iron supplement   Have you had any abnormal blood loss such as black, tarry or bloody stools, or abnormal vaginal bleeding? No   Have you ever had a blood transfusion? No   Are you willing to have a blood transfusion if it is medically needed before, during, or after your surgery? Yes   Have you or any of your relatives ever had problems with anesthesia? No   Do you have sleep apnea, excessive snoring or daytime drowsiness? No   Do you have any artifical heart valves or other implanted medical devices like a pacemaker, defibrillator, or continuous glucose monitor? No   Do you have artificial joints? No   Are you allergic to latex? No     Advance Care Planning        Preoperative Review of    reviewed - controlled substances reflected in medication list.      Status of Chronic Conditions:  See problem list for active medical problems.  Problems all longstanding and stable, except as noted/documented.  See ROS for pertinent symptoms related to these conditions.    Patient Active Problem List    Diagnosis Date Noted    Dermatochalasis of both upper eyelids 03/10/2025      Priority: Medium    Ptosis of both eyelids 03/10/2025     Priority: Medium    Acne 02/15/2016     Priority: Medium    Eczema 02/15/2016     Priority: Medium    Nocturnal leg cramps 10/02/2015     Priority: Medium    Attention deficit hyperactivity disorder (ADHD) 06/14/2011     Priority: Medium    GERD (gastroesophageal reflux disease) 06/14/2011     Priority: Medium    Allergic rhinitis 03/01/2010     Priority: Medium    FAMILIA (generalized anxiety disorder)      Priority: Medium      Past Medical History:   Diagnosis Date    Depressive disorder     FAMILIA (generalized anxiety disorder)     Hypertension      No past surgical history on file.  Current Outpatient Medications   Medication Sig Dispense Refill    ALPRAZolam (XANAX) 1 MG tablet Take 1 mg by mouth nightly as needed for sleep.      amphetamine-dextroamphetamine (ADDERALL) 30 MG tablet Take 30 mg by mouth.      busPIRone (BUSPAR) 10 MG tablet Take 10 mg by mouth.      desvenlafaxine (PRISTIQ) 100 MG 24 hr tablet Take 100 mg by mouth.      estradiol (ESTRACE) 0.1 MG/GM vaginal cream Place 2 g vaginally twice a week. 42.5 g 4    hydroquinone (JOSE M) 4 % external cream Apply a thin layer to the affected areas once daily at night. 30 mL 0    ketoconazole (NIZORAL) 2 % external cream APPLY TO CORNERS OF THE MOUTH TWICE DAILY AS NEEDED FOR RASH      nystatin (MYCOSTATIN) 733383 UNIT/GM external powder Apply topically 2 times daily as needed for dry skin. 60 g 3    nystatin (MYCOSTATIN) 270573 UNIT/ML suspension Take 5 mLs (500,000 Units) by mouth 4 times daily. 473 mL 0    pimecrolimus (ELIDEL) 1 % external cream APPLY SMALL AMOUNT TO THE AFFECTED AREA TWICE DAILY      tretinoin (RETIN-A) 0.025 % external cream Apply topically at bedtime. 45 g 3       Allergies   Allergen Reactions    Penicillins Hives     Hospitalized due to severe rxn.    Sulfamethoxazole-Trimethoprim Shortness Of Breath    Bactrim      Throat closed up    Citalopram Hives    Citalopram  "Hydrobromide Rash        Social History     Tobacco Use    Smoking status: Never    Smokeless tobacco: Never   Substance Use Topics    Alcohol use: Yes     Comment: Very rarely       History   Drug Use No             Review of Systems  Constitutional, HEENT, cardiovascular, pulmonary, gi and gu systems are negative, except as otherwise noted.    Objective    BP (!) 160/96   Pulse 95   Temp 97.6  F (36.4  C) (Oral)   Resp 19   Ht 1.613 m (5' 3.5\")   Wt 70.3 kg (155 lb)   LMP 01/15/2025 (Approximate)   SpO2 100%   BMI 27.03 kg/m     Estimated body mass index is 27.03 kg/m  as calculated from the following:    Height as of this encounter: 1.613 m (5' 3.5\").    Weight as of this encounter: 70.3 kg (155 lb).  Physical Exam  Patient a bit stressed today and just took adderall a hour before appointment   GENERAL: alert and no distress  HENT: normal cephalic/atraumatic and ear canals and TM's normal  NECK: no adenopathy, no asymmetry, masses, or scars  RESP: lungs clear to auscultation - no rales, rhonchi or wheezes  CV: regular rates and rhythm and normal S1 S2, no S3 or S4  MS: no gross musculoskeletal defects noted, no edema    Recent Labs   Lab Test 12/23/24  1210   HGB 12.5         POTASSIUM 3.7   CR 0.86   A1C 5.1        Diagnostics  No labs were ordered during this visit.   No EKG required, no history of coronary heart disease, significant arrhythmia, peripheral arterial disease or other structural heart disease.    Revised Cardiac Risk Index (RCRI)  The patient has the following serious cardiovascular risks for perioperative complications:   - No serious cardiac risks = 0 points     RCRI Interpretation: 0 points: Class I (very low risk - 0.4% complication rate)         Signed Electronically by: Elsy Guardado PA-C  A copy of this evaluation report is provided to the requesting physician.         "

## 2025-04-29 NOTE — PATIENT INSTRUCTIONS
How to Take Your Medication Before Surgery  Preoperative Medication Instructions   Antiplatelet or Anticoagulation Medication Instructions   - We reviewed the medication list and the patient is not on an antiplatelet or anticoagulation medications.    Additional Medication Instructions  Take all scheduled medications on the day of surgery EXCEPT for modifications listed below:   - Benzodiazepines: Continue without modification.   - Psychostimulants: Hold the day of surgery   - SSRIs, SNRIs, TCAs, Antipsychotics: Continue without modification.   No creams or lotions on the face   and DO not take adderall the day of surgery        Patient Education   Preparing for Your Surgery  For Adults  Getting started  In most cases, a nurse will call to review your health history and instructions. They will give you an arrival time based on your scheduled surgery time. Please be ready to share:  Your doctor's clinic name and phone number  Your medical, surgical, and anesthesia history  A list of allergies and sensitivities  A list of medicines, including herbal treatments and over-the-counter drugs  Whether the patient has a legal guardian (ask how to send us the papers in advance)  Note: You may not receive a call if you were seen at our PAC (Preoperative Assessment Center).  Please tell us if you're pregnant--or if there's any chance you might be pregnant. Some surgeries may injure a fetus (unborn baby), so they require a pregnancy test. Surgeries that are safe for a fetus don't always need a test, and you can choose whether to have one.   Preparing for surgery  Within 10 to 30 days of surgery: Have a pre-op exam (sometimes called an H&P, or History and Physical). This can be done at a clinic or pre-operative center.  If you're having a , you may not need this exam. Talk to your care team.  At your pre-op exam, talk to your care team about all medicines you take. (This includes CBD oil and any drugs, such as THC,  marijuana, and other forms of cannabis.) If you need to stop any medicine before surgery, ask when to start taking it again.  This is for your safety. Many medicines and drugs can make you bleed too much during surgery. Some change how well surgery (anesthesia) drugs work.  Call your insurance company to let them know you're having surgery. (If you don't have insurance, call [phone].)  Call your clinic if there's any change in your health. This includes a scrape or scratch near the surgery site, or any signs of a cold (sore throat, runny nose, cough, rash, fever).  Eating and drinking guidelines  For your safety: Unless your surgeon tells you otherwise, follow the guidelines below.  Eat and drink as normal until 8 hours before you arrive for surgery. After that, no food or milk. You can spit out gum when you arrive.  Drink clear liquids until 2 hours before you arrive. These are liquids you can see through, like water, Gatorade, and Propel Water. They also include plain black coffee and tea (no cream or milk).  No alcohol for 24 hours before you arrive. The night before surgery, stop any drinks that contain THC.  If your care team tells you to take medicine on the morning of surgery, it's okay to take it with a sip of water. No other medicines or drugs are allowed (including CBD oil)--follow your care team's instructions.  If you have questions the day of surgery, call your hospital or surgery center.   Preventing infection  Shower or bathe the night before and the morning of surgery. Follow the instructions your clinic gave you. (If no instructions, use regular soap.)  Don't shave or clip hair near your surgery site. We'll remove the hair if needed.  Don't smoke or vape the morning of surgery. No chewing tobacco for 6 hours before you arrive. A nicotine patch is okay. You may spit out nicotine gum when you arrive.  For some surgeries, the surgeon will tell you to fully quit smoking and nicotine.  We will make  every effort to keep you safe from infection. We will:  Clean our hands often with soap and water (or an alcohol-based hand rub).  Clean the skin at your surgery site with a special soap that kills germs.  Give you a special gown to keep you warm. (Cold raises the risk of infection.)  Wear hair covers, masks, gowns, and gloves during surgery.  Give antibiotic medicine, if prescribed. Not all surgeries need this medicine.  What to bring on the day of surgery  Photo ID and insurance card  Copy of your health care directive, if you have one  Glasses and hearing aids (bring cases)  You can't wear contacts during surgery  Inhaler and eye drops, if you use them (tell us about these when you arrive)  CPAP machine or breathing device, if you use them  A few personal items, if spending the night  If you have . . .  A pacemaker, ICD (cardiac defibrillator), or other implant: Bring the ID card.  An implanted stimulator: Bring the remote control.  A legal guardian: Bring a copy of the certified (court-stamped) guardianship papers.  Please remove any jewelry, including body piercings. Leave jewelry and other valuables at home.  If you're going home the day of surgery  You must have a responsible adult drive you home. They should stay with you overnight as well.  If you don't have someone to stay with you, and you aren't safe to go home alone, we may keep you overnight. Insurance often won't pay for this.  After surgery  If it's hard to control your pain or you need more pain medicine, please call your surgeon's office.  Questions?   If you have any questions for your care team, list them here:   ____________________________________________________________________________________________________________________________________________________________________________________________________________________________________________________________  For informational purposes only. Not to replace the advice of your health care  provider. Copyright   2003, 2019 [facility]. All rights reserved. Clinically reviewed by Jay Mortensen MD. [employer] [id number] - REV 08/24.

## 2025-05-06 ENCOUNTER — ANESTHESIA EVENT (OUTPATIENT)
Dept: SURGERY | Facility: AMBULATORY SURGERY CENTER | Age: 55
End: 2025-05-06
Payer: COMMERCIAL

## 2025-05-06 NOTE — ANESTHESIA PREPROCEDURE EVALUATION
"Anesthesia Pre-Procedure Evaluation    Patient: Aga Savage   MRN: [id number] : 1970        Procedure : Procedure(s):  REPAIR, PTOSIS, BILATERAL, WITH BILATERAL UPPER BLEPHAROPLASTY          Past Medical History:   Diagnosis Date    Depressive disorder     FAMILIA (generalized anxiety disorder)     Hypertension       No past surgical history on file.   Allergies   Allergen Reactions    Penicillins Hives     Hospitalized due to severe rxn.    Sulfamethoxazole-Trimethoprim Shortness Of Breath    Bactrim      Throat closed up    Citalopram Hives    Citalopram Hydrobromide Rash      Social History     Tobacco Use    Smoking status: Never    Smokeless tobacco: Never   Substance Use Topics    Alcohol use: Yes     Comment: Very rarely      Wt Readings from Last 1 Encounters:   25 70.3 kg (155 lb)           Physical Exam    Airway        Mallampati: II   TM distance: > 3 FB   Neck ROM: full   Mouth opening: > 3 cm    Respiratory Devices and Support         Dental       (+) Minor Abnormalities - some fillings, tiny chips      Cardiovascular   cardiovascular exam normal          Pulmonary   pulmonary exam normal                OUTSIDE LABS:  CBC:   Lab Results   Component Value Date    WBC 9.6 2024    WBC 14.0 (H) 2009    HGB 12.5 2024    HGB 13.8 2009    HCT 39.1 2024    HCT 40.0 2009     2024     2009     BMP:   Lab Results   Component Value Date     2024    POTASSIUM 3.7 2024    CHLORIDE 103 2024    CO2 27 2024    BUN 12.3 2024    CR 0.86 2024    GLC 99 2024     COAGS: No results found for: \"PTT\", \"INR\", \"FIBR\"  POC:   Lab Results   Component Value Date    HCG Negative 2010     HEPATIC:   Lab Results   Component Value Date    ALBUMIN 4.2 2024    PROTTOTAL 7.0 2024    ALT 18 2024    AST 24 2024    ALKPHOS 124 2024    BILITOTAL 0.2 2024     OTHER:   Lab " "Results   Component Value Date    A1C 5.1 12/23/2024    ZEB 9.3 12/23/2024    TSH 1.52 12/23/2024       Anesthesia Plan    ASA Status:  2    NPO Status:  NPO Appropriate          Maintenance: TIVA.        Consents    Anesthesia Plan(s) and associated risks, benefits, and realistic alternatives discussed. Questions answered and patient/representative(s) expressed understanding.     - Discussed:     - Discussed with:  Patient            Postoperative Care       PONV prophylaxis: Dexamethasone or Solumedrol, Ondansetron (or other 5HT-3), Background Propofol Infusion     Comments:               Da Richard MD    Clinically Significant Risk Factors Present on Admission                             # Overweight: Estimated body mass index is 27.03 kg/m  as calculated from the following:    Height as of 4/29/25: 1.613 m (5' 3.5\").    Weight as of 4/29/25: 70.3 kg (155 lb).                "

## 2025-05-07 ENCOUNTER — HOSPITAL ENCOUNTER (OUTPATIENT)
Facility: AMBULATORY SURGERY CENTER | Age: 55
Discharge: HOME | End: 2025-05-07
Attending: OPHTHALMOLOGY
Payer: COMMERCIAL

## 2025-05-07 ENCOUNTER — ANESTHESIA (OUTPATIENT)
Dept: SURGERY | Facility: AMBULATORY SURGERY CENTER | Age: 55
End: 2025-05-07
Payer: COMMERCIAL

## 2025-05-07 VITALS
WEIGHT: 155 LBS | RESPIRATION RATE: 18 BRPM | HEIGHT: 64 IN | SYSTOLIC BLOOD PRESSURE: 209 MMHG | TEMPERATURE: 97.8 F | DIASTOLIC BLOOD PRESSURE: 110 MMHG | HEART RATE: 76 BPM | OXYGEN SATURATION: 99 % | BODY MASS INDEX: 26.46 KG/M2

## 2025-05-07 DIAGNOSIS — Z98.890 POSTOPERATIVE EYE STATE: Primary | ICD-10-CM

## 2025-05-07 RX ORDER — ERYTHROMYCIN 5 MG/G
OINTMENT OPHTHALMIC
Qty: 3.5 G | Refills: 1 | Status: SHIPPED | OUTPATIENT
Start: 2025-05-07

## 2025-05-07 RX ORDER — DEXAMETHASONE SODIUM PHOSPHATE 10 MG/ML
4 INJECTION, SOLUTION INTRAMUSCULAR; INTRAVENOUS
Status: DISCONTINUED | OUTPATIENT
Start: 2025-05-07 | End: 2025-05-08 | Stop reason: HOSPADM

## 2025-05-07 RX ORDER — OXYCODONE HYDROCHLORIDE 5 MG/1
5 TABLET ORAL
Status: COMPLETED | OUTPATIENT
Start: 2025-05-07 | End: 2025-05-07

## 2025-05-07 RX ORDER — LABETALOL HYDROCHLORIDE 5 MG/ML
10 INJECTION, SOLUTION INTRAVENOUS
Status: DISCONTINUED | OUTPATIENT
Start: 2025-05-07 | End: 2025-05-08 | Stop reason: HOSPADM

## 2025-05-07 RX ORDER — KETAMINE HYDROCHLORIDE 10 MG/ML
INJECTION INTRAMUSCULAR; INTRAVENOUS PRN
Status: DISCONTINUED | OUTPATIENT
Start: 2025-05-07 | End: 2025-05-07

## 2025-05-07 RX ORDER — ONDANSETRON 4 MG/1
4 TABLET, ORALLY DISINTEGRATING ORAL EVERY 30 MIN PRN
Status: DISCONTINUED | OUTPATIENT
Start: 2025-05-07 | End: 2025-05-08 | Stop reason: HOSPADM

## 2025-05-07 RX ORDER — TETRACAINE HYDROCHLORIDE 5 MG/ML
SOLUTION OPHTHALMIC PRN
Status: DISCONTINUED | OUTPATIENT
Start: 2025-05-07 | End: 2025-05-07 | Stop reason: HOSPADM

## 2025-05-07 RX ORDER — SODIUM CHLORIDE, SODIUM LACTATE, POTASSIUM CHLORIDE, CALCIUM CHLORIDE 600; 310; 30; 20 MG/100ML; MG/100ML; MG/100ML; MG/100ML
INJECTION, SOLUTION INTRAVENOUS CONTINUOUS PRN
Status: DISCONTINUED | OUTPATIENT
Start: 2025-05-07 | End: 2025-05-07

## 2025-05-07 RX ORDER — NALOXONE HYDROCHLORIDE 0.4 MG/ML
0.1 INJECTION, SOLUTION INTRAMUSCULAR; INTRAVENOUS; SUBCUTANEOUS
Status: DISCONTINUED | OUTPATIENT
Start: 2025-05-07 | End: 2025-05-08 | Stop reason: HOSPADM

## 2025-05-07 RX ORDER — NEOMYCIN POLYMYXIN B SULFATES AND DEXAMETHASONE 3.5; 10000; 1 MG/ML; [USP'U]/ML; MG/ML
SUSPENSION/ DROPS OPHTHALMIC
Qty: 5 ML | Refills: 0 | Status: SHIPPED | OUTPATIENT
Start: 2025-05-07

## 2025-05-07 RX ORDER — ACETAMINOPHEN 325 MG/1
975 TABLET ORAL ONCE
Status: COMPLETED | OUTPATIENT
Start: 2025-05-07 | End: 2025-05-07

## 2025-05-07 RX ORDER — ERYTHROMYCIN 5 MG/G
OINTMENT OPHTHALMIC PRN
Status: DISCONTINUED | OUTPATIENT
Start: 2025-05-07 | End: 2025-05-07 | Stop reason: HOSPADM

## 2025-05-07 RX ORDER — LIDOCAINE 40 MG/G
CREAM TOPICAL
Status: DISCONTINUED | OUTPATIENT
Start: 2025-05-07 | End: 2025-05-08 | Stop reason: HOSPADM

## 2025-05-07 RX ORDER — LIDOCAINE HYDROCHLORIDE 20 MG/ML
INJECTION, SOLUTION INFILTRATION; PERINEURAL PRN
Status: DISCONTINUED | OUTPATIENT
Start: 2025-05-07 | End: 2025-05-07

## 2025-05-07 RX ORDER — SODIUM CHLORIDE, SODIUM LACTATE, POTASSIUM CHLORIDE, CALCIUM CHLORIDE 600; 310; 30; 20 MG/100ML; MG/100ML; MG/100ML; MG/100ML
INJECTION, SOLUTION INTRAVENOUS CONTINUOUS
Status: DISCONTINUED | OUTPATIENT
Start: 2025-05-07 | End: 2025-05-08 | Stop reason: HOSPADM

## 2025-05-07 RX ORDER — PROPOFOL 10 MG/ML
INJECTION, EMULSION INTRAVENOUS PRN
Status: DISCONTINUED | OUTPATIENT
Start: 2025-05-07 | End: 2025-05-07

## 2025-05-07 RX ORDER — HYDROMORPHONE HYDROCHLORIDE 1 MG/ML
0.4 INJECTION, SOLUTION INTRAMUSCULAR; INTRAVENOUS; SUBCUTANEOUS EVERY 5 MIN PRN
Status: DISCONTINUED | OUTPATIENT
Start: 2025-05-07 | End: 2025-05-08 | Stop reason: HOSPADM

## 2025-05-07 RX ORDER — ONDANSETRON 2 MG/ML
4 INJECTION INTRAMUSCULAR; INTRAVENOUS EVERY 30 MIN PRN
Status: DISCONTINUED | OUTPATIENT
Start: 2025-05-07 | End: 2025-05-08 | Stop reason: HOSPADM

## 2025-05-07 RX ORDER — HYDROMORPHONE HYDROCHLORIDE 1 MG/ML
0.2 INJECTION, SOLUTION INTRAMUSCULAR; INTRAVENOUS; SUBCUTANEOUS EVERY 5 MIN PRN
Status: DISCONTINUED | OUTPATIENT
Start: 2025-05-07 | End: 2025-05-08 | Stop reason: HOSPADM

## 2025-05-07 RX ORDER — OXYCODONE HYDROCHLORIDE 5 MG/1
10 TABLET ORAL
Status: DISCONTINUED | OUTPATIENT
Start: 2025-05-07 | End: 2025-05-08 | Stop reason: HOSPADM

## 2025-05-07 RX ORDER — FENTANYL CITRATE 50 UG/ML
25 INJECTION, SOLUTION INTRAMUSCULAR; INTRAVENOUS EVERY 5 MIN PRN
Status: DISCONTINUED | OUTPATIENT
Start: 2025-05-07 | End: 2025-05-08 | Stop reason: HOSPADM

## 2025-05-07 RX ORDER — FENTANYL CITRATE 50 UG/ML
50 INJECTION, SOLUTION INTRAMUSCULAR; INTRAVENOUS EVERY 5 MIN PRN
Status: DISCONTINUED | OUTPATIENT
Start: 2025-05-07 | End: 2025-05-08 | Stop reason: HOSPADM

## 2025-05-07 RX ORDER — OXYCODONE HYDROCHLORIDE 5 MG/1
5 TABLET ORAL EVERY 6 HOURS PRN
Qty: 12 TABLET | Refills: 0 | Status: SHIPPED | OUTPATIENT
Start: 2025-05-07 | End: 2025-05-10

## 2025-05-07 RX ORDER — LIDOCAINE HYDROCHLORIDE AND EPINEPHRINE 10; 10 MG/ML; UG/ML
INJECTION, SOLUTION INFILTRATION; PERINEURAL PRN
Status: DISCONTINUED | OUTPATIENT
Start: 2025-05-07 | End: 2025-05-07 | Stop reason: HOSPADM

## 2025-05-07 RX ADMIN — LIDOCAINE HYDROCHLORIDE 70 MG: 20 INJECTION, SOLUTION INFILTRATION; PERINEURAL at 08:05

## 2025-05-07 RX ADMIN — PROPOFOL 20 MG: 10 INJECTION, EMULSION INTRAVENOUS at 08:08

## 2025-05-07 RX ADMIN — ACETAMINOPHEN 975 MG: 325 TABLET ORAL at 06:56

## 2025-05-07 RX ADMIN — PROPOFOL 20 MG: 10 INJECTION, EMULSION INTRAVENOUS at 08:07

## 2025-05-07 RX ADMIN — KETAMINE HYDROCHLORIDE 20 MG: 10 INJECTION INTRAMUSCULAR; INTRAVENOUS at 08:15

## 2025-05-07 RX ADMIN — PROPOFOL 30 MG: 10 INJECTION, EMULSION INTRAVENOUS at 08:10

## 2025-05-07 RX ADMIN — PROPOFOL 30 MG: 10 INJECTION, EMULSION INTRAVENOUS at 08:06

## 2025-05-07 RX ADMIN — SODIUM CHLORIDE, SODIUM LACTATE, POTASSIUM CHLORIDE, CALCIUM CHLORIDE: 600; 310; 30; 20 INJECTION, SOLUTION INTRAVENOUS at 07:51

## 2025-05-07 RX ADMIN — SODIUM CHLORIDE, SODIUM LACTATE, POTASSIUM CHLORIDE, CALCIUM CHLORIDE: 600; 310; 30; 20 INJECTION, SOLUTION INTRAVENOUS at 07:00

## 2025-05-07 RX ADMIN — OXYCODONE HYDROCHLORIDE 5 MG: 5 TABLET ORAL at 08:56

## 2025-05-07 NOTE — BRIEF OP NOTE
[facility] Brief Operative Note    Pre-operative diagnosis: Dermatochalasis of both upper eyelids [H02.831, H02.834]  Ptosis of both eyelids [H02.403]   Post-operative diagnosis Same as above   Procedure: Procedure(s):  REPAIR, PTOSIS, BILATERAL, WITH BILATERAL UPPER BLEPHAROPLASTY   Surgeon(s): Surgeons and Role:     * Mykel Koehler MD - Primary     * Nghia Thomas MD - Resident - Assisting     * Twyla Medellin MD - Fellow - Assisting   Estimated blood loss: * No values recorded between 5/7/2025  8:16 AM and 5/7/2025  8:35 AM *    Specimens: * No specimens in log *   Findings: As expected

## 2025-05-07 NOTE — ANESTHESIA POSTPROCEDURE EVALUATION
Patient: Aga Savage    Procedure: Procedure(s):  REPAIR, PTOSIS, BILATERAL, WITH BILATERAL UPPER BLEPHAROPLASTY       Anesthesia Type:  MAC    Note:  Disposition: Outpatient   Postop Pain Control: Uneventful            Sign Out: Well controlled pain   PONV: No   Neuro/Psych: Uneventful            Sign Out: Acceptable/Baseline neuro status   Airway/Respiratory: Uneventful            Sign Out: Acceptable/Baseline resp. status   CV/Hemodynamics: Uneventful            Sign Out: Acceptable CV status; No obvious hypovolemia; No obvious fluid overload   Other NRE:    DID A NON-ROUTINE EVENT OCCUR?            Last vitals:  Vitals Value Taken Time   /107 05/07/25 09:41   Temp 36.6  C (97.8  F) 05/07/25 09:40   Pulse 55 05/07/25 09:41   Resp 18 05/07/25 09:40   SpO2 100 % 05/07/25 09:43   Vitals shown include unfiled device data.    Electronically Signed By: Da Richard MD  May 7, 2025  10:55 AM

## 2025-05-07 NOTE — ANESTHESIA CARE TRANSFER NOTE
Patient: Aga Savage    Procedure: Procedure(s):  REPAIR, PTOSIS, BILATERAL, WITH BILATERAL UPPER BLEPHAROPLASTY       Diagnosis: Dermatochalasis of both upper eyelids [H02.831, H02.834]  Ptosis of both eyelids [H02.403]  Diagnosis Additional Information: No value filed.    Anesthesia Type:   MAC     Note:    Oropharynx: oropharynx clear of all foreign objects and spontaneously breathing  Level of Consciousness: awake  Oxygen Supplementation: room air    Independent Airway: airway patency satisfactory and stable  Dentition: dentition unchanged  Vital Signs Stable: post-procedure vital signs reviewed and stable  Report to RN Given: handoff report given  Patient transferred to: Phase II    Handoff Report: Identifed the Patient, Identified the Reponsible Provider, Reviewed the pertinent medical history, Discussed the surgical course, Reviewed Intra-OP anesthesia mangement and issues during anesthesia, Set expectations for post-procedure period and Allowed opportunity for questions and acknowledgement of understanding      Vitals:  Vitals Value Taken Time   /84 05/07/25 08:41   Temp 36.2  C (97.2  F) 05/07/25 08:41   Pulse 78 05/07/25 08:41   Resp 16 05/07/25 08:41   SpO2 98 % 05/07/25 08:46   Vitals shown include unfiled device data.    Electronically Signed By: ANA Cooper CRNA  May 7, 2025  8:47 AM

## 2025-05-07 NOTE — DISCHARGE INSTRUCTIONS
Post-operative Instructions    Ophthalmic Plastic and Reconstructive Surgery  Mykel Koehler M.D.  Twyla Medellin M.D.    All instructions apply to the operated eye(s) or eyelid(s)      What to expect after surgery:  There will be some swelling, bruising, and likely a black eye (even into the lower eyelids and cheeks). Also expect crusting and discharge from the eye and/or incisions.   A small amount of surface bleeding is normal for the first 48 hours after surgery.  You may notice some bloody tears for the first few days after surgery. This is normal.  Your eye(s) and eyelid(s) may be painful and tender. This is normal after surgery. Use the pain medication as prescribed. If your pain does not improve despite the medication, contact the office.    Wound care and personal care:  Apply ice compresses 15 minutes on 15 minutes off while awake for the first 2 days after surgery, then switch to warm compresses 4 times a day until seen by your physician.   For warm packs you can place a cup of dry uncooked rice in a clean cotton sock. Place sock in microwave 30 seconds to one minute. Next place the warm sock into a plastic bag and wrap the bag with clean warm wet washcloth and place over operated eye.    You may shower or wash your hair the day after surgery. Do not bathe or go swimming for 1 week to prevent contamination of your wounds.  Do not apply make-up to the eyes or eyelids for 2 weeks after surgery.    Activity restrictions and driving:  Avoid heavy lifting, bending, exercise or strenuous activity for 1 week after surgery.  You may resume other activities and return to work as tolerated.  You may not resume driving until have you stopped using narcotic pain medications(such as Norco, Percocet, Tylenol #3).    Medications:  Restart all your regular home medications and eye drops today. If you take Plavix or Aspirin on a regular basis, wait for 3 days after your surgery before restarting these in order to  decrease the risk of bleeding complications.  Avoid aspirin and aspirin-like medications (Motrin, Aleve, Ibuprofen, [place] etc) for 5 days to reduce the risk of bleeding. You may take Tylenol (acetaminophen) for pain.  In addition to your home medications, take the following post-operative medications as prescribed by your physician:  Apply antibiotic ointment (erythromycin) to all sutures three times a day, and into the operated eye(s) at night.   Instill eye drops (Maxitrol) four times a day until the bottle finished.   Take scheduled extra strength Tylenol for pain.  You may take 1 to 2 pain pills (norco or oxycodone as prescribed) as needed for breakthrough pain up to every 6 hours.  The pain pills may make you drowsy. You must not drive a car, operate heavy machinery or drink alcohol while taking them.  The pain pills may cause constipation and nausea. Take them with some food to prevent a stomach upset. If you continue to experience nausea, call your physician.    WARNING: All the prescription pain medications listed above contain Tylenol (acetaminophen). You must not take more than 4,000 mg of acetaminophen per 24-hour period. This is equivalent to 6 tablets of Darvocet, 8 tablets of Vicodin, or 12 tablets of Norco, Percocet or Tylenol #3. If you take other over-the-counter medications containing acetaminophen, you must take the amount of acetaminophen into account and reduce the number of prescribed pain pills accordingly.    Contact information and follow-up:  Return to the Eye Clinic for a follow-up appointment with your physician as scheduled. If no appointment has been scheduled, call [phone] for an appointment with Dr. Koehler within 1 to 2 weeks from your date of surgery.    For severe pain, bleeding, or loss of vision, call the Eye Clinic at [phone].  After hours or on weekends and holidays, call [phone] and ask to speak with the ophthalmologist on call.  [facility]  Surgery and Procedure [facility]  Home Care Following Anesthesia  For 24 hours after surgery:  Get plenty of rest.  A responsible adult must stay with you for at least 24 hours after you leave the surgery center.  Do not drive or use heavy equipment.  If you have weakness or tingling, don't drive or use heavy equipment until this feeling goes away.   Do not drink alcohol.   Avoid strenuous or risky activities.  Ask for help when climbing stairs.  You may feel lightheaded.  IF so, sit for a few minutes before standing.  Have someone help you get up.   If you have nausea (feel sick to your stomach): Drink only clear liquids such as apple juice, ginger ale, broth or 7-Up.  Rest may also help.  Be sure to drink enough fluids.  Move to a regular diet as you feel able.   You may have a slight fever.  Call the doctor if your fever is over 100 F (37.7 C) (taken under the tongue) or lasts longer than 24 hours.  You may have a dry mouth, a sore throat, muscle aches or trouble sleeping. These should go away after 24 hours.  Do not make important or legal decisions.   It is recommended to avoid smoking.               Tips for taking pain medications  To get the best pain relief possible, remember these points:  Take pain medications as directed, before pain becomes severe.  Pain medication can upset your stomach: taking it with food may help.  Constipation is a common side effect of pain medication. Drink plenty of  fluids.  Eat foods high in fiber. Take a stool softener if recommended by your doctor or pharmacist.  Do not drink alcohol, drive or operate machinery while taking pain medications.  Ask about other ways to control pain, such as with heat, ice or relaxation.    Tylenol/Acetaminophen Consumption    If you feel your pain relief is insufficient, you may take Tylenol/Acetaminophen in addition to your narcotic pain medication.   Be careful not to exceed 4,000 mg of Tylenol/Acetaminophen in a 24 hour period from all  sources.  If you are taking extra strength Tylenol/acetaminophen (500 mg), the maximum dose is 8 tablets in 24 hours.  If you are taking regular strength acetaminophen (325 mg), the maximum dose is 12 tablets in 24 hours.  Last dose of Tylenol received at 0700am, 975mg. May have next dose after 130pm.     Call a doctor for any of the following:  Signs of infection (fever, growing tenderness at the surgery site, a large amount of drainage or bleeding, severe pain, foul-smelling drainage, redness, swelling).  It has been over 8 to 10 hours since surgery and you are still not able to urinate (pass water).  Headache for over 24 hours.  Numbness, tingling or weakness the day after surgery (if you had spinal anesthesia).  Signs of Covid-19 infection (temperature over 100 degrees, shortness of breath, cough, loss of taste/smell, generalized body aches, persistent headache, chills, sore throat, nausea/vomiting/diarrhea)    Your doctor is:  Dr. Mykel Koehler, Ophthalmology: [phone]                    After hours and weekends call the hospital @ [phone] and ask for the resident on call for:  Ophthalmology  For emergency care, call the:  [facility]:  [phone] (TTY for hearing impaired: [phone])

## 2025-05-07 NOTE — OP NOTE
PREOPERATIVE DIAGNOSIS:   1. Bilateral upper eyelid dermatochalasis.   2. Bilateral upper eyelid ptosis.   POSTOPERATIVE DIAGNOSES:   1. Bilateral upper eyelid dermatochalasis.   2. Bilateral upper eyelid ptosis.   PROCEDURE PERFORMED:   1. Bilateral upper blepharoplasty.   2. Bilateral upper eyelid ptosis repair by Messina's muscle conjunctival resection.   SURGEON: Mykel Koehler MD   ASSISTANT: Twyla Medellin MD, REJI and Nghia Thomas MD   ANESTHESIA: Monitored with local infiltration of 1% lidocaine with epinephrine.   COMPLICATIONS: None.   ESTIMATED BLOOD LOSS: Less than 5 mL.   HISTORY: Aga Savage  presented with upper lid drooping interfering with his superior visual field and activities of daily living. After the risks, benefits and alternatives to the proposed procedure were explained, informed consent was obtained.   DESCRIPTION OF PROCEDURE: Aga Savage  was brought to the operating room and placed supine on the operating table. IV sedation was given. The upper eyelid crease and excess upper eyelid skin was marked with a marking pen and infiltrated with local anesthetic.  The area was prepped and draped in the typical sterile ophthalmic fashion. Attention was directed to the right side. Skin was incised following marked lines. Skin flap was excised with a high temperature cautery. A row of cautery was placed in the orbicularis along the inferior incision line.  Hemostasis was obtained. A 4-0 silk suture was placed through the eyelid margin and the eyelid everted over a Desmarres retractor. . A 6-0 silk suture was then threaded through the conjunctiva and Messina's muscle 4mm from the superior tarsal border. These sutures were used to elevate the conjunctiva and Messina's muscle which was gently peeled from the underlying levator muscle.The Putterman clamp was used and clamped over the elevated tissues. A 6-0 plain gut suture was run in a horizontal mattress fashion 1 mm below the clamp  from lateral to medial, then medial to lateral. The elevated tissues were excised with a 15 blade. The sutures were then externalized into the blepharoplasty incision and tied. The 4-0 silk suture was removed. The lid was reverted to its normal position. The skin incision was closed with a running 6-0 plain gut suture. The same procedure was performed on the left side.The patient tolerated the procedures well. Antibiotic ointment was applied. Aga Savage left the operating room in stable condition.     AMBIKA VÁSQUEZ MD

## 2025-05-12 ENCOUNTER — E-VISIT (OUTPATIENT)
Dept: FAMILY MEDICINE | Facility: CLINIC | Age: 55
End: 2025-05-12
Payer: COMMERCIAL

## 2025-05-12 ENCOUNTER — TELEPHONE (OUTPATIENT)
Dept: OPHTHALMOLOGY | Facility: CLINIC | Age: 55
End: 2025-05-12

## 2025-05-12 DIAGNOSIS — R69 DIAGNOSIS UNKNOWN: Primary | ICD-10-CM

## 2025-05-12 PROCEDURE — 99207 PR NON-BILLABLE SERV PER CHARTING: CPT

## 2025-05-20 ENCOUNTER — VIRTUAL VISIT (OUTPATIENT)
Dept: FAMILY MEDICINE | Facility: CLINIC | Age: 55
End: 2025-05-20
Payer: COMMERCIAL

## 2025-05-20 DIAGNOSIS — F41.1 GAD (GENERALIZED ANXIETY DISORDER): ICD-10-CM

## 2025-05-20 DIAGNOSIS — N95.1 SYMPTOMATIC MENOPAUSAL OR FEMALE CLIMACTERIC STATES: Primary | ICD-10-CM

## 2025-05-20 PROCEDURE — 98006 SYNCH AUDIO-VIDEO EST MOD 30: CPT | Performed by: PHYSICIAN ASSISTANT

## 2025-05-20 RX ORDER — ESZOPICLONE 1 MG/1
1 TABLET, FILM COATED ORAL AT BEDTIME
COMMUNITY
Start: 2025-03-19

## 2025-05-20 RX ORDER — ESTRADIOL/NORETHINDRONE ACETATE TRANSDERMAL SYSTEM .05; .25 MG/D; MG/D
1 PATCH, EXTENDED RELEASE TRANSDERMAL
COMMUNITY

## 2025-05-20 ASSESSMENT — ANXIETY QUESTIONNAIRES
5. BEING SO RESTLESS THAT IT IS HARD TO SIT STILL: SEVERAL DAYS
2. NOT BEING ABLE TO STOP OR CONTROL WORRYING: SEVERAL DAYS
3. WORRYING TOO MUCH ABOUT DIFFERENT THINGS: SEVERAL DAYS
5. BEING SO RESTLESS THAT IT IS HARD TO SIT STILL: SEVERAL DAYS
6. BECOMING EASILY ANNOYED OR IRRITABLE: SEVERAL DAYS
GAD7 TOTAL SCORE: 7
4. TROUBLE RELAXING: SEVERAL DAYS
2. NOT BEING ABLE TO STOP OR CONTROL WORRYING: SEVERAL DAYS
3. WORRYING TOO MUCH ABOUT DIFFERENT THINGS: SEVERAL DAYS
6. BECOMING EASILY ANNOYED OR IRRITABLE: SEVERAL DAYS
GAD7 TOTAL SCORE: 6
7. FEELING AFRAID AS IF SOMETHING AWFUL MIGHT HAPPEN: NOT AT ALL
GAD7 TOTAL SCORE: 7
8. IF YOU CHECKED OFF ANY PROBLEMS, HOW DIFFICULT HAVE THESE MADE IT FOR YOU TO DO YOUR WORK, TAKE CARE OF THINGS AT HOME, OR GET ALONG WITH OTHER PEOPLE?: SOMEWHAT DIFFICULT
7. FEELING AFRAID AS IF SOMETHING AWFUL MIGHT HAPPEN: SEVERAL DAYS
GAD7 TOTAL SCORE: 6
1. FEELING NERVOUS, ANXIOUS, OR ON EDGE: SEVERAL DAYS
GAD7 TOTAL SCORE: 6
7. FEELING AFRAID AS IF SOMETHING AWFUL MIGHT HAPPEN: NOT AT ALL
IF YOU CHECKED OFF ANY PROBLEMS ON THIS QUESTIONNAIRE, HOW DIFFICULT HAVE THESE PROBLEMS MADE IT FOR YOU TO DO YOUR WORK, TAKE CARE OF THINGS AT HOME, OR GET ALONG WITH OTHER PEOPLE: SOMEWHAT DIFFICULT
GAD7 TOTAL SCORE: 7
1. FEELING NERVOUS, ANXIOUS, OR ON EDGE: SEVERAL DAYS
7. FEELING AFRAID AS IF SOMETHING AWFUL MIGHT HAPPEN: SEVERAL DAYS
IF YOU CHECKED OFF ANY PROBLEMS ON THIS QUESTIONNAIRE, HOW DIFFICULT HAVE THESE PROBLEMS MADE IT FOR YOU TO DO YOUR WORK, TAKE CARE OF THINGS AT HOME, OR GET ALONG WITH OTHER PEOPLE: VERY DIFFICULT
4. TROUBLE RELAXING: SEVERAL DAYS
8. IF YOU CHECKED OFF ANY PROBLEMS, HOW DIFFICULT HAVE THESE MADE IT FOR YOU TO DO YOUR WORK, TAKE CARE OF THINGS AT HOME, OR GET ALONG WITH OTHER PEOPLE?: VERY DIFFICULT

## 2025-05-20 ASSESSMENT — PATIENT HEALTH QUESTIONNAIRE - PHQ9
10. IF YOU CHECKED OFF ANY PROBLEMS, HOW DIFFICULT HAVE THESE PROBLEMS MADE IT FOR YOU TO DO YOUR WORK, TAKE CARE OF THINGS AT HOME, OR GET ALONG WITH OTHER PEOPLE: SOMEWHAT DIFFICULT
SUM OF ALL RESPONSES TO PHQ QUESTIONS 1-9: 5
10. IF YOU CHECKED OFF ANY PROBLEMS, HOW DIFFICULT HAVE THESE PROBLEMS MADE IT FOR YOU TO DO YOUR WORK, TAKE CARE OF THINGS AT HOME, OR GET ALONG WITH OTHER PEOPLE: SOMEWHAT DIFFICULT
SUM OF ALL RESPONSES TO PHQ QUESTIONS 1-9: 7
SUM OF ALL RESPONSES TO PHQ QUESTIONS 1-9: 7
SUM OF ALL RESPONSES TO PHQ QUESTIONS 1-9: 5

## 2025-05-20 NOTE — PATIENT INSTRUCTIONS
Dear Aga,    We are sorry you are not feeling well. Based on the responses you provided, it is recommended that you be seen in-person in clinic so we can better evaluate your symptoms. Please schedule this visit in [employer]. You will have a Schedule Now button in [employer] to help with scheduling this appointment. Otherwise, you can call [phone] to schedule an appointment.     You will not be charged for this eVisit. Thank you for trusting us with your care.     Cynthia Ventura DNP    Thank you for choosing us for your care. I think an in-clinic or virtual visit would be the best next step based on your symptoms as we may need more information into what would be beneficial for symptom management. Please schedule a clinic appointment; you won t be charged for this eVisit.      You can schedule an appointment by clicking here in [employer], or call [phone].

## 2025-05-20 NOTE — PROGRESS NOTES
"Aga is a 55 year old who is being evaluated via a billable video visit.    How would you like to obtain your AVS? MyChart  If the video visit is dropped, the invitation should be resent by: Text to cell phone: [phone]  Will anyone else be joining your video visit? No      Assessment & Plan     Symptomatic menopausal or female climacteric states  Trial of lower dose combipatch sent to pharmacy. Over the counter and supportive care discussed with patient as well. Return to clinic with any worsening or changes in symptoms or go to ER Urgent care in off hours.  - estradiol-norethindrone (COMBIPATCH) 0.05-0.14 MG/DAY bi-weekly patch; Place 1 patch over 96 hours onto the skin twice a week.    FAMILIA (generalized anxiety disorder)  Related to above vs situational - combipatch changed as above; mental health referral in place for therapist and patient to follow up with psychiatrist as soon as possible. Over the counter and supportive care discussed with patient as well with as needed L-theanine  - Adult Mental Health  Referral; Future      BMI  Estimated body mass index is 27.03 kg/m  as calculated from the following:    Height as of 5/7/25: 1.613 m (5' 3.5\").    Weight as of 5/7/25: 70.3 kg (155 lb).   Weight management plan: Discussed healthy diet and exercise guidelines      Follow-up  Return in about 3 months (around 8/20/2025) for with PCP, Routine Visit, or sooner with worsening symptoms.    Subjective   Aga is a 55 year old, presenting for the following health issues:  No chief complaint on file.    History of Present Illness       Headaches:   Since the patient's last clinic visit, headaches are: worsened  The patient is getting headaches:  Jusr since surgery  She is able to do normal daily activities when she has a migraine.  The patient is taking the following rescue/relief medications:  Tylenol   Patient states \"The relief is inconsistent\" from the rescue/relief medications.   The patient is taking " "the following medications to prevent migraines:  No medications to prevent migraines  In the past 4 weeks, the patient has gone to an Urgent Care or Emergency Room 0 times times due to headaches.    Reason for visit:  Talk about feelings after eye surgery    She eats 2-3 servings of fruits and vegetables daily.She consumes 1 sweetened beverage(s) daily.She exercises with enough effort to increase her heart rate 30 to 60 minutes per day.  She exercises with enough effort to increase her heart rate 3 or less days per week.   She is taking medications regularly.      Patient recently had bilateral ptosis surgery with upper blepharoplasty.  No pain, but having issues with mental health. Feels like she's going to \"fail at work\"  Patient was supposed to go back to work today but had a panic attack and missed work.  Patient working with psychiatrist regularly but wasn't able to see them today.  Patient doesn't have a counselor in place yet.  Patient recently restarted combipatch x 2 patches but was given highest dose option.    Review of Systems  Constitutional, HEENT, cardiovascular, pulmonary, gi and gu systems are negative, except as otherwise noted.      Objective           Vitals:  No vitals were obtained today due to virtual visit.    Physical Exam   GENERAL: alert and no distress  EYES: Eyes grossly normal to inspection.  No discharge or erythema, or obvious scleral/conjunctival abnormalities.  RESP: No audible wheeze, cough, or visible cyanosis.    SKIN: Visible skin clear. No significant rash, abnormal pigmentation or lesions.  NEURO: Cranial nerves grossly intact.  Mentation and speech appropriate for age.  PSYCH: Appropriate affect, tone, and pace of words          Video-Visit Details    Type of service:  Video Visit   Originating Location (pt. Location): Home    Distant Location (provider location):  Off-site  Platform used for Video Visit: Reginaldo  Signed Electronically by: Valerio Laws PA-C    "

## 2025-05-20 NOTE — LETTER
"May 20, 2025      Aga Savage  [address] [facility] [id number]        To Whom It May Concern:    Aga Savage was seen on 5/20/25.  Please excuse her until 5/21/25 due to illness.        Sincerely,      Luisa \"Valerio\" NADINE Laws   "

## 2025-05-21 ENCOUNTER — PATIENT OUTREACH (OUTPATIENT)
Dept: CARE COORDINATION | Facility: CLINIC | Age: 55
End: 2025-05-21

## 2025-05-29 ENCOUNTER — VIRTUAL VISIT (OUTPATIENT)
Dept: FAMILY MEDICINE | Facility: CLINIC | Age: 55
End: 2025-05-29
Payer: COMMERCIAL

## 2025-05-29 DIAGNOSIS — L71.9 ROSACEA: ICD-10-CM

## 2025-05-29 DIAGNOSIS — R03.0 ELEVATED BLOOD PRESSURE READING WITHOUT DIAGNOSIS OF HYPERTENSION: ICD-10-CM

## 2025-05-29 DIAGNOSIS — R53.83 FATIGUE, UNSPECIFIED TYPE: Primary | ICD-10-CM

## 2025-05-29 DIAGNOSIS — N95.1 PERIMENOPAUSE: ICD-10-CM

## 2025-05-29 DIAGNOSIS — K21.00 GASTROESOPHAGEAL REFLUX DISEASE WITH ESOPHAGITIS WITHOUT HEMORRHAGE: ICD-10-CM

## 2025-05-29 RX ORDER — OMEPRAZOLE 20 MG/1
20 CAPSULE, DELAYED RELEASE ORAL DAILY
Qty: 30 CAPSULE | Refills: 5 | Status: SHIPPED | OUTPATIENT
Start: 2025-05-29

## 2025-05-29 RX ORDER — AZELAIC ACID 0.15 G/G
1 GEL TOPICAL 2 TIMES DAILY
Qty: 50 G | Refills: 11 | Status: SHIPPED | OUTPATIENT
Start: 2025-05-29

## 2025-05-29 NOTE — PROGRESS NOTES
"Aga is a 55 year old who is being evaluated via a billable video visit.    How would you like to obtain your AVS? MyChart  If the video visit is dropped, the invitation should be resent by: Text to cell phone: [phone]  Will anyone else be joining your video visit? No      Assessment & Plan     Fatigue, unspecified type  Not likely from surgery or MHT patch.   Sounds like it's improving.  Anxiety and perimenopause can cause fatigue as can stress, illness.    Continue to get good sleep, exercise and nutrition.   Schedule an in person visit in 2-4 weeks with PCP, would recommend further evaluation of fatigue at an in person visit if it persists.     Rosacea    - azelaic acid (FINACIA) 15 % external gel; Apply 1 inch topically 2 times daily.    Gastroesophageal reflux disease with esophagitis without hemorrhage    - omeprazole (PRILOSEC) 20 MG DR capsule; Take 1 capsule (20 mg) by mouth daily.    Perimenopause  Continue combined patch. Irregular bleeding is ok in the first 6 months, if persistent recommend work up to r/o endometrial abnormality/ca    Elevated blood pressure reading without diagnosis of hypertension  Needs follow up. Please start monitoring home BP's and schedule in person visit with pcp in 2-4 weeks.                Subjective   Aga is a 55 year old, presenting for the following health issues:  No chief complaint on file.    History of Present Illness       Headaches:   Since the patient's last clinic visit, headaches are: worsened  The patient is getting headaches:  Jusr since surgery  She is able to do normal daily activities when she has a migraine.  The patient is taking the following rescue/relief medications:  Tylenol   Patient states \"The relief is inconsistent\" from the rescue/relief medications.   The patient is taking the following medications to prevent migraines:  No medications to prevent migraines  In the past 4 weeks, the patient has gone to an Urgent Care or Emergency Room 0 times " "times due to headaches.    Reason for visit:  Talk about feelings after eye surgery    She eats 2-3 servings of fruits and vegetables daily.She consumes 1 sweetened beverage(s) daily.She exercises with enough effort to increase her heart rate 30 to 60 minutes per day.  She exercises with enough effort to increase her heart rate 3 or less days per week.   She is taking medications regularly.        Video visit for fatigue. Missed 2 days of work this week due to fatigue and needs a letter for her employer. Feels less fatigued today and is planning to return to work today.   \"Not a familiar tired.\" Feels like she wants to lie down and sleep. Feels worn out.   Denies depression.   Recently had more anxiety Virtual Visit with Luisa Laws PA-C (05/20/2025)     No sweats, weight loss, fever, malaise.      Ptosis surgery 5/6, wonders if fatigue is from surgery.     On estrogen/progesterone patch, wonders if the patch is causing fatigue. On the patch for 2 weeks.   Started having vaginal bleeding Saturday, concerned this is abnormal with MHT patch. Is perimenopausal, had a period in Jan.     Works as  at [employer].   No regular exercise apart from work.   Sleep schedule: 8 hours a night    Last clinic BP was high, hasn't been rechecked.     Primary clinic: [facility].                   Objective           Vitals:  No vitals were obtained today due to virtual visit.    Physical Exam   GENERAL: alert and no distress  EYES: Eyes grossly normal to inspection.  No discharge or erythema, or obvious scleral/conjunctival abnormalities.  RESP: No audible wheeze, cough, or visible cyanosis.    SKIN: Visible skin clear. No significant rash, abnormal pigmentation or lesions.  NEURO: Cranial nerves grossly intact.  Mentation and speech appropriate for age.  PSYCH: Appropriate affect, tone, and pace of words          Video-Visit Details    Type of service:  Video Visit   Joined the call at 5/29/2025, " 11:02:46 am.  Left the call at 5/29/2025, 11:19:21 am.  Originating Location (pt. Location): Home    Distant Location (provider location):  On-site  Platform used for Video Visit: Reginaldo  Signed Electronically by: ANA Dorado CNP

## 2025-05-29 NOTE — LETTER
May 29, 2025      Aga Savage  [address] [address] [address]        To Whom It May Concern:    Aga Savage  was seen on 5/29/25 and may return to work today without restrictions. She missed work earlier this week because she wasn't feeling well.         Sincerely,        Brittney Pérez, ANA CNP    Electronically signed

## 2025-05-29 NOTE — PATIENT INSTRUCTIONS
Things to Know About Hormone Therapy     Menopause is a normal life event for women - it is not an illness or medical condition.  Every person's experience of menopause is different.  Some women have many symptoms that interfere with life, and others may not experience any charge other than their period stops.    Many women with symptoms often suffer in silence and do not realize how effective and safe hormone therapy can be to improving their symptoms and quality of life.  Most common symptoms  Hot flashes  Night Sweats  Painful sex and/or dry vagina    The most effective way to treat hot flashes, night sweats and painful dry vagina is with hormone therapy.      There are three categories of hormone therapy    If you are still having periods, and it is safe for you, a low dose birth control pill with both estrogen and progesterone can work very well to provide contraception and relieve symptoms.      Estrogen Therapy or ET  means estrogen only therapy.  Estrogen is the hormone that provides the most menopausal symptom relief.  It is a much lower dose than used in a low dose birth control pill.  Estrogen only therapy is only for women who had had their uterus removed with a surgery.    Estrogen Progesterone Therapy or EPT means taking both estrogen and progesterone.  The progesterone protects the uterus from developing uterine cancer from estrogen alone.  This can be from taking a pill, having a Kyleena or Mirena IUD or using a combined product      There are two basic ways to use hormone therapy:    Systemic which means to circulate in the blood stream to all parts of the body.  This is given either as an oral tablet, a patch, a vaginal ring, or a vaginal cream.  Systemic hormones are used to treat hot flashes and night sweats     Local which means the product only affects a specific or localized area of the body.  This can be given as a cream, a ring or a tablet and is used to treat vaginal symptoms of menopause.   Less risk because the blood level of estrogen and progesterone is not increasing    Maxwell time for using systemic hormone therapy    You are having hot flashes and/or night sweats  You are within 10 years of menopause and under age 60    Benefits of using systemic hormone therapy may include    Effectively treats hot flashes, night sweats and vaginal dryness and pain  Helps prevent bone loss  May improve mood swings  May improve sex drive or low libido   Helps reduce risk of future cardiovascular disease, type 2 Diabetes, osteoarthritis, and dementia when used within 10 yrs of menopause     Risks of using systemic hormone therapy     Increased risk of developing a clot, rare, and less increase when using a patch  Increased risk of breast cancer, about the same increase in risks as having a glass or two of wine each night or being overweight.  When using Prometrium, the same progesterone hormone the ovary makes, there is less of an increase in risk     Side Effects     You may have vaginal bleeding again initially, if bleeding persists beyond 6 months please let us know so we can screen you for uterine cancer  Breast tenderness     For more information about Menopause    A book Hot and Bothered by Cosme Lange  A book The New Munday Pause by Dr Sharona Trinh  A podcast Deena Mario 'From PMS to Menopause: How to Hack Your Hormones'   The book The New Rules of Menopause; A [facility] Guide to Perimenopause and Beyond by Dr Erin Williamson  The North American Menopause Society   The [facility] - Menopause       References:   Menopause Practice; A Clinician's Guide, 6th Edition, The North American Menopause Society   The New Rules of Menopause; A [facility] Guide to Perimenopause and Beyond, Erin Williamson M.D., M.B.A., Director of [facility]     Compiled by Leigh Ann PEREZ CNM, MSCP (Menopause Society Certified Practitioner) 1/9/2024

## 2025-06-04 ENCOUNTER — TELEPHONE (OUTPATIENT)
Dept: OPHTHALMOLOGY | Facility: CLINIC | Age: 55
End: 2025-06-04
Payer: COMMERCIAL

## 2025-06-04 NOTE — TELEPHONE ENCOUNTER
Patient confirmed rescheduled POST-OP appointment:  Date: 6/9/25  Time: 3:30PM  Visit type: POST-OP  Provider:   Location: [facility] Location   Testing/imaging: NONE  Additional notes: POST-OP 1-2 weeks. DOS 5/7/25-Appt Per PT      Patient will see appointment in [facility].

## 2025-06-06 ENCOUNTER — HOSPITAL ENCOUNTER (EMERGENCY)
Facility: CLINIC | Age: 55
Discharge: HOME | End: 2025-06-06
Attending: EMERGENCY MEDICINE | Admitting: EMERGENCY MEDICINE
Payer: COMMERCIAL

## 2025-06-06 VITALS
WEIGHT: 153 LBS | OXYGEN SATURATION: 100 % | SYSTOLIC BLOOD PRESSURE: 160 MMHG | TEMPERATURE: 97.6 F | BODY MASS INDEX: 26.68 KG/M2 | HEART RATE: 90 BPM | DIASTOLIC BLOOD PRESSURE: 96 MMHG | RESPIRATION RATE: 16 BRPM

## 2025-06-06 DIAGNOSIS — I10 HYPERTENSION, UNSPECIFIED TYPE: ICD-10-CM

## 2025-06-06 DIAGNOSIS — K59.09 OTHER CONSTIPATION: ICD-10-CM

## 2025-06-06 DIAGNOSIS — H53.8 BLURRED VISION: ICD-10-CM

## 2025-06-06 DIAGNOSIS — G47.09 OTHER INSOMNIA: ICD-10-CM

## 2025-06-06 LAB
ANION GAP SERPL CALCULATED.3IONS-SCNC: 13 MMOL/L (ref 7–15)
ATRIAL RATE - MUSE: 86 BPM
BASOPHILS # BLD AUTO: 0.1 10E3/UL (ref 0–0.2)
BASOPHILS NFR BLD AUTO: 1 %
BUN SERPL-MCNC: 18.2 MG/DL (ref 6–20)
CALCIUM SERPL-MCNC: 9.1 MG/DL (ref 8.8–10.4)
CHLORIDE SERPL-SCNC: 104 MMOL/L (ref 98–107)
CREAT SERPL-MCNC: 1.05 MG/DL (ref 0.51–0.95)
DIASTOLIC BLOOD PRESSURE - MUSE: NORMAL MMHG
EGFRCR SERPLBLD CKD-EPI 2021: 63 ML/MIN/1.73M2
EOSINOPHIL # BLD AUTO: 0.6 10E3/UL (ref 0–0.7)
EOSINOPHIL NFR BLD AUTO: 9 %
ERYTHROCYTE [DISTWIDTH] IN BLOOD BY AUTOMATED COUNT: 14.2 % (ref 10–15)
GLUCOSE SERPL-MCNC: 76 MG/DL (ref 70–99)
HCO3 SERPL-SCNC: 22 MMOL/L (ref 22–29)
HCT VFR BLD AUTO: 38.1 % (ref 35–47)
HGB BLD-MCNC: 13.1 G/DL (ref 11.7–15.7)
HOLD SPECIMEN: NORMAL
HOLD SPECIMEN: NORMAL
IMM GRANULOCYTES # BLD: 0 10E3/UL
IMM GRANULOCYTES NFR BLD: 0 %
INTERPRETATION ECG - MUSE: NORMAL
LYMPHOCYTES # BLD AUTO: 2.5 10E3/UL (ref 0.8–5.3)
LYMPHOCYTES NFR BLD AUTO: 36 %
MCH RBC QN AUTO: 29.6 PG (ref 26.5–33)
MCHC RBC AUTO-ENTMCNC: 34.4 G/DL (ref 31.5–36.5)
MCV RBC AUTO: 86 FL (ref 78–100)
MONOCYTES # BLD AUTO: 0.7 10E3/UL (ref 0–1.3)
MONOCYTES NFR BLD AUTO: 11 %
NEUTROPHILS # BLD AUTO: 3 10E3/UL (ref 1.6–8.3)
NEUTROPHILS NFR BLD AUTO: 44 %
NRBC # BLD AUTO: 0 10E3/UL
NRBC BLD AUTO-RTO: 0 /100
P AXIS - MUSE: 63 DEGREES
PLATELET # BLD AUTO: 301 10E3/UL (ref 150–450)
POTASSIUM SERPL-SCNC: 3.6 MMOL/L (ref 3.4–5.3)
PR INTERVAL - MUSE: 154 MS
QRS DURATION - MUSE: 76 MS
QT - MUSE: 352 MS
QTC - MUSE: 421 MS
R AXIS - MUSE: 55 DEGREES
RBC # BLD AUTO: 4.43 10E6/UL (ref 3.8–5.2)
SODIUM SERPL-SCNC: 139 MMOL/L (ref 135–145)
SYSTOLIC BLOOD PRESSURE - MUSE: NORMAL MMHG
T AXIS - MUSE: 62 DEGREES
VENTRICULAR RATE- MUSE: 86 BPM
WBC # BLD AUTO: 6.8 10E3/UL (ref 4–11)

## 2025-06-06 PROCEDURE — 99284 EMERGENCY DEPT VISIT MOD MDM: CPT

## 2025-06-06 PROCEDURE — 82435 ASSAY OF BLOOD CHLORIDE: CPT | Performed by: EMERGENCY MEDICINE

## 2025-06-06 PROCEDURE — 93005 ELECTROCARDIOGRAM TRACING: CPT

## 2025-06-06 PROCEDURE — 36415 COLL VENOUS BLD VENIPUNCTURE: CPT | Performed by: EMERGENCY MEDICINE

## 2025-06-06 PROCEDURE — 85025 COMPLETE CBC W/AUTO DIFF WBC: CPT | Performed by: EMERGENCY MEDICINE

## 2025-06-06 PROCEDURE — 85041 AUTOMATED RBC COUNT: CPT | Performed by: EMERGENCY MEDICINE

## 2025-06-06 PROCEDURE — 99207 PR NO BILLABLE SERVICE THIS VISIT: CPT | Performed by: NURSE PRACTITIONER

## 2025-06-06 RX ORDER — LORAZEPAM 1 MG/1
1 TABLET ORAL ONCE
Status: DISCONTINUED | OUTPATIENT
Start: 2025-06-06 | End: 2025-06-06

## 2025-06-06 RX ORDER — DIAZEPAM 5 MG/1
5 TABLET ORAL
Qty: 1 TABLET | Refills: 0 | Status: SHIPPED | OUTPATIENT
Start: 2025-06-06 | End: 2025-06-06

## 2025-06-06 RX ORDER — DOCUSATE SODIUM 100 MG/1
100 CAPSULE, LIQUID FILLED ORAL 3 TIMES DAILY PRN
Qty: 20 CAPSULE | Refills: 0 | Status: SHIPPED | OUTPATIENT
Start: 2025-06-06

## 2025-06-06 RX ORDER — DIAZEPAM 5 MG/1
5 TABLET ORAL
Qty: 1 TABLET | Refills: 0 | Status: SHIPPED | OUTPATIENT
Start: 2025-06-06

## 2025-06-06 RX ORDER — DOCUSATE SODIUM 100 MG/1
100 CAPSULE, LIQUID FILLED ORAL 3 TIMES DAILY PRN
Qty: 20 CAPSULE | Refills: 0 | Status: SHIPPED | OUTPATIENT
Start: 2025-06-06 | End: 2025-06-06

## 2025-06-06 ASSESSMENT — COLUMBIA-SUICIDE SEVERITY RATING SCALE - C-SSRS
1. IN THE PAST MONTH, HAVE YOU WISHED YOU WERE DEAD OR WISHED YOU COULD GO TO SLEEP AND NOT WAKE UP?: NO
6. HAVE YOU EVER DONE ANYTHING, STARTED TO DO ANYTHING, OR PREPARED TO DO ANYTHING TO END YOUR LIFE?: NO
2. HAVE YOU ACTUALLY HAD ANY THOUGHTS OF KILLING YOURSELF IN THE PAST MONTH?: NO

## 2025-06-06 ASSESSMENT — ACTIVITIES OF DAILY LIVING (ADL)
ADLS_ACUITY_SCORE: 41
ADLS_ACUITY_SCORE: 41

## 2025-06-06 NOTE — CONSULTS
"  [facility]    Stroke Telephone Note    I was called by Quang Bassett Md on 06/06/25 regarding patient Aga Savage. The patient is a 55 year old female with PMH of ADHD, anxiety, ptosis of both eyelids. LKW reported as 3 days ago (6/3/) when she had onset of binocular blurred vision. She has also had transient paresthesia in her bilateral fingers, toes and around her mouth. Per ED provider patient is very anxious appearing but has a normal funduscopic exam and no deficits on neurological exam.     Vitals  BP: (!) 198/122   Pulse: 101   Resp: (!) 73   Temp: 97.6  F (36.4  C)   Weight: 69.4 kg (153 lb)    Imaging Findings  pending    Impression  Binocular blurred vision, transient bilateral paresthesia. Overall low suspicion for intracranial cause giver bilateral/diffuse symptoms but will exclude with MRI    Recommendations  -brain MRI without contrast; please page stroke neurology if infarct is identified for further recommendations  -->if MRI is negative, no further stroke workup is recommended    Case discussed with vascular neurology attending Dr. Lozano.    My recommendations are based on the information provided over the phone by Aga Savage's in-person providers. They are not intended to replace the clinical judgment of her in-person providers. I was not requested to personally see or examine the patient at this time.     ANA Anne CNP  Vascular Neurology    To page me or covering stroke neurology team member, click here: AMCOM  Choose \"On Call\" tab at top, then select \"NEUROLOGY/ALL SITES\" from middle drop-down box, press Enter, then look for \"stroke\" or \"telestroke\" for your site.   "

## 2025-06-06 NOTE — ED PROVIDER NOTES
Emergency Department Note      History of Present Illness     Chief Complaint   Eye Problem      HPI   Aga Savage is a 55 year old female with a history of bilateral blepharoplasty on 5/7/25 presenting to the ED for an evaluation of an eye problem. The patient reports that she has been experiencing persistent blurry vision for 3 days. The patient states that she went to work at around 1300 today, and when she looked at the computer screen, her blurry vision became worse. She states both eyes are blurry and she does not have any visual field deficits. She endorses dots in her vision as well while looking at the computer. The patient states that she does not typically wear her prescription far-sighted glasses but her vision got worse when she wore them today. She states she has a tingling sensation in her toes, fingers, and lips. She does state she had some swelling on her upper eyelash lid this morning. Of note, the patient used an OTC allergy eye drop and neomycin-polymyxin-dexamethasone drops today. The patient states that she took 3x Advil twice today, once when she woke up around 0900 and the second time before work. Of note, the patient brought up feeling symptoms of panic attacks and has run out of her Xanax prescription. Patient denies headache, pain in her eyes, curtain in her vision, or discharge from the eyes. She denies history of diabetes, high blood pressure, or migraines.    Review of External Notes   Had a bilateral blepharoplasty on 5/7/2025. History of migraine.    Past Medical History     Medical History and Problem List   Depression  Anxiety  ADHD  Hypertension  GERD  Eczema  Anemia  MARISOL III  Angular cheilitis  Dermatochalasis of both upper eyelids  Ptosis of both eyelids    Medications   diazepam (VALIUM) 5 MG tablet  docusate sodium (COLACE) 100 MG capsule  ALPRAZolam (XANAX) 1 MG tablet  amphetamine-dextroamphetamine (ADDERALL) 30 MG tablet  azelaic acid (FINACIA) 15 % external  gel  COMBIPATCH 0.05-0.25 MG/DAY bi-weekly patch  desvenlafaxine (PRISTIQ) 100 MG 24 hr tablet  erythromycin (ROMYCIN) 5 MG/GM ophthalmic ointment  estradiol (ESTRACE) 0.1 MG/GM vaginal cream  estradiol-norethindrone (COMBIPATCH) 0.05-0.14 MG/DAY bi-weekly patch  eszopiclone (LUNESTA) 1 MG tablet  hydroquinone (JOSE M) 4 % external cream  ketoconazole (NIZORAL) 2 % external cream  neomycin-polymixin-dexAMETHasone (MAXITROL) 0.1 % ophthalmic suspension  nystatin (MYCOSTATIN) 516683 UNIT/GM external powder  nystatin (MYCOSTATIN) 726036 UNIT/ML suspension  omeprazole (PRILOSEC) 20 MG DR capsule  pimecrolimus (ELIDEL) 1 % external cream  tretinoin (RETIN-A) 0.025 % external cream        Surgical History   Repair ptosis and blepharoplasty, bilateral     Physical Exam     Patient Vitals for the past 24 hrs:   BP Temp Temp src Pulse Resp SpO2 Weight   06/06/25 1631 -- -- -- -- 16 -- --   06/06/25 1619 -- -- -- 90 28 -- --   06/06/25 1618 (!) 160/96 -- -- 79 -- -- --   06/06/25 1555 -- -- -- 88 10 100 % --   06/06/25 1535 -- -- -- 84 19 -- --   06/06/25 1451 -- -- -- 101 (!) 73 100 % --   06/06/25 1449 -- -- -- 87 11 100 % --   06/06/25 1440 (!) 158/96 -- -- -- -- 100 % --   06/06/25 1422 (!) 198/122 97.6  F (36.4  C) Temporal (!) 126 16 95 % 69.4 kg (153 lb)     Physical Exam  General: The patient is alert, in no respiratory distress. Anxious.     HEENT: Mucous membranes moist. Pupils are enlarged. Funduscopic exam is normal.     Cardiovascular: Regular rate and rhythm. Good pulses in all four extremities. Normal capillary refill and skin turgor.     Respiratory: Lungs are clear. No nasal flaring. No retractions. No wheezing, no crackles.    Gastrointestinal: Abdomen soft. No guarding, no rebound. No palpable hernias.     Musculoskeletal: No gross deformity.     Skin: No rashes or petechiae.     Neurologic: The patient is alert and oriented x3. GCS 15. No testable cranial nerve deficit. Follows commands with clear and  appropriate speech. Gives appropriate answers. Good strength in all extremities. No gross neurologic deficit. Gross sensation intact. Pupils are round and reactive. No meningismus.     Lymphatic: No cervical adenopathy. No lower extremity swelling.    Psychiatric: The patient is non-tearful.     Diagnostics     Lab Results   Labs Ordered and Resulted from Time of ED Arrival to Time of ED Departure   BASIC METABOLIC PANEL - Abnormal       Result Value    Sodium 139      Potassium 3.6      Chloride 104      Carbon Dioxide (CO2) 22      Anion Gap 13      Urea Nitrogen 18.2      Creatinine 1.05 (*)     GFR Estimate 63      Calcium 9.1      Glucose 76     CBC WITH PLATELETS AND DIFFERENTIAL    WBC Count 6.8      RBC Count 4.43      Hemoglobin 13.1      Hematocrit 38.1      MCV 86      MCH 29.6      MCHC 34.4      RDW 14.2      Platelet Count 301      % Neutrophils 44      % Lymphocytes 36      % Monocytes 11      % Eosinophils 9      % Basophils 1      % Immature Granulocytes 0      NRBCs per 100 WBC 0      Absolute Neutrophils 3.0      Absolute Lymphocytes 2.5      Absolute Monocytes 0.7      Absolute Eosinophils 0.6      Absolute Basophils 0.1      Absolute Immature Granulocytes 0.0      Absolute NRBCs 0.0         Imaging   No orders to display       EKG   ECG taken at 1437, ECG read at 1447  Normal sinus rhythm. Normal ECG   Rate 86 bpm. NM interval 154 ms. QRS duration 76 ms. QT/QTc 352/421 ms. P-R-T axes 63 55 62.    Independent Interpretation   None    ED Course      Medications Administered   Medications - No data to display    Procedures   Procedures     Discussion of Management   None    ED Course   ED Course as of 06/06/25 1829 Fri Jun 06, 2025   1449 I obtained history and examined the patient as noted above.    1511 Talked with stroke neuro regarding the patient's history and presentation, and they recommended an MRI.    1603 Rechecked with patient and she declined an MRI, and would prefer to discharge.         Additional Documentation  None    Medical Decision Making / Diagnosis     CMS Diagnoses: None    MIPS   None               MDM   Aga Savage is a 55 year old female who about 1 month ago had surgery on her eyes however it was due to her lids and did not involve the globe itself.  The patient reports that she has had blurred vision over the last 3 days and is therefore outside the window for tPA.  I did discuss the case with stroke neurology and question the patient she has been taking Benadryl for allergies eyedrops her pupils are quite dilated at this point and I think likely with her normal funduscopic exam that this is an anticholinergic issue she also reported dry mouth.  I discussed with stroke neurology performing an MRI of the brain the patient did not want to do this at her symptoms already starting to get better I did discuss we could be missing some things but felt this is likely medication related and especially specifically an antihistamine and she was discharged home otherwise neurologically intact I did stress of her with vision or to worsen visual loss or curtain to come down over her eye she would need to return Suzanne to the ER.    Disposition   The patient was discharged.     Diagnosis     ICD-10-CM    1. Blurred vision  H53.8       2. Hypertension, unspecified type  I10       3. Other constipation  K59.09       4. Other insomnia  G47.09            Discharge Medications   Discharge Medication List as of 6/6/2025  4:10 PM        START taking these medications    Details   diazepam (VALIUM) 5 MG tablet Take 1 tablet (5 mg) by mouth nightly as needed for anxiety or sleep., Disp-1 tablet, R-0, Local Print      docusate sodium (COLACE) 100 MG capsule Take 1 capsule (100 mg) by mouth 3 times daily as needed for constipation., Disp-20 capsule, R-0, Local Print               Scribe Disclosure:  I, Jeremiah Draper and Aydee Watson, am serving as a scribe at 3:14 PM on 6/6/2025 to document  services personally performed by Quang Bassett MD based on my observations and the provider's statements to me.        Quang Bassett MD  06/06/25 1822

## 2025-06-06 NOTE — ED TRIAGE NOTES
Bilateral blurred vision for the past 3 days. Patient reports that her blurred vision has worsened today. She also reports intermittent numbness to various parts of her body. BEFAST negative in triage. Stroke eval called. Patient is anxious and diaphoretic in triage.

## 2025-06-09 ENCOUNTER — TELEPHONE (OUTPATIENT)
Dept: OPHTHALMOLOGY | Facility: CLINIC | Age: 55
End: 2025-06-09

## 2025-06-09 NOTE — TELEPHONE ENCOUNTER
Unable to reach or eave voice message due to VBox was full.     Left Voicemail (1st Attempt) for the patient to call back and schedule for an earlier appointment :    Appointment type: POST-OP  Provider:   Return date: 6/9/25  Writer's direct phone number: [phone]  Additional appointment(s) needed: NONE  Additional Notes: POST-OP

## 2025-06-16 ENCOUNTER — OFFICE VISIT (OUTPATIENT)
Dept: OPHTHALMOLOGY | Facility: CLINIC | Age: 55
End: 2025-06-16
Payer: COMMERCIAL

## 2025-06-16 DIAGNOSIS — Z98.890 POSTOPERATIVE EYE STATE: Primary | ICD-10-CM

## 2025-06-16 PROCEDURE — 99024 POSTOP FOLLOW-UP VISIT: CPT | Performed by: STUDENT IN AN ORGANIZED HEALTH CARE EDUCATION/TRAINING PROGRAM

## 2025-06-16 ASSESSMENT — TONOMETRY
OD_IOP_MMHG: 19
OS_IOP_MMHG: 19
IOP_METHOD: ICARE

## 2025-06-16 ASSESSMENT — SLIT LAMP EXAM - LIDS
COMMENTS: INCISION C/D/I
COMMENTS: INCISION C/D/I

## 2025-06-16 ASSESSMENT — VISUAL ACUITY
OD_SC+: -1
OS_PH_SC: 20/30
OS_SC+: -1+1
OS_PH_SC+: +2
OD_SC: 20/70
METHOD: SNELLEN - LINEAR
OS_SC: 20/70
OD_PH_SC: 20/30
OD_PH_SC+: -2

## 2025-06-16 ASSESSMENT — MARGIN REFLEX DISTANCE
OD_MRD1: 2
OS_MRD1: 2

## 2025-06-16 ASSESSMENT — EXTERNAL EXAM - LEFT EYE: OS_EXAM: NORMAL

## 2025-06-16 ASSESSMENT — EXTERNAL EXAM - RIGHT EYE: OD_EXAM: NORMAL

## 2025-06-16 NOTE — PROGRESS NOTES
Chief Complaints and History of Present Illnesses   Patient presents with    Post Op (Ophthalmology) Both Eyes     POM1 s/p Combined repair ptosis with blepharoplasty bilateral     Chief Complaint(s) and History of Present Illness(es)     Post Op (Ophthalmology) Both Eyes    Associated symptoms include Negative for eye pain, flashes and floaters.    Treatments tried include no treatments.  Pain was noted as 0/10.   Additional comments: POM1 s/p Combined repair ptosis with blepharoplasty   bilateral    Comments    10 days ago went to ED for blurry vision each eye.   She had a reaction to claritin, advil and nasacort.  Denies eyelid pain or discomfort.   Ocular Meds: None     Kyle Ho 10:19 AM June 16, 2025         Assessment & Plan     Aga Savage is a 55 year old female with the following diagnoses:   1. Postoperative eye state       Aga Savage is 1 month s/p BULB and MMCR (5/7/25)  The incision(s) are healing well.  The lid(s)  are  in excellent position.    I have recommended:  -Artificial tears and warm compresses as needed for comfort  -Follow up with oculoplastics as needed  -Discussed may obtain updated Mrx after 3 months post operatively          Twyla Medellin MD  Oculoplastic Surgery Fellow  Attending Physician Attestation:  I did not see this patient on Jun 16, 2025, but I have reviewed the relevant history, examination findings, assessment, and plan as documented by others.  I have confirmed and edited as necessary the assessment and plan and agree with this note.  - Mykel Koehler., MD 10:52 AM 6/16/2025

## 2025-06-23 ENCOUNTER — TELEPHONE (OUTPATIENT)
Dept: GASTROENTEROLOGY | Facility: CLINIC | Age: 55
End: 2025-06-23
Payer: COMMERCIAL

## 2025-06-23 NOTE — TELEPHONE ENCOUNTER
"Endoscopy Scheduling Screen    Caller: patient    Have you had any respiratory illness or flu-like symptoms in the last 10 days?  No    Patient is ACTIVE on [employer].  Inform patient that all appointment instructions will be sent via TweetUp.    Review patient's insurance for any non participating payor.    Ordering/Referring Provider:     MARYBETH TUCKER      (If ordering provider performs procedure, schedule with ordering provider unless otherwise instructed. )    BMI: Estimated body mass index is 26.68 kg/m  as calculated from the following:    Height as of 5/7/25: 1.613 m (5' 3.5\").    Weight as of 6/6/25: 69.4 kg (153 lb).     Sedation Ordered  moderate sedation.   If patient BMI > 50 do not schedule in [facility].    If patient BMI > 45 do not schedule at [facility].    Are you taking methadone or Suboxone?  NO, No RN review required.    Have you been diagnosed and are being treated for severe PTSD or severe anxiety?  NO, No RN review required.    Are you taking any prescription medications for pain 3 or more times per week?   NO, No RN review required.    Do you have a history of malignant hyperthermia?  No    (Females) Are you currently pregnant?        Have you been diagnosed or told you have pulmonary hypertension?   No    Do you have an LVAD?  No    Have you been told you have moderate to severe sleep apnea?  No.    Have you been told you have COPD, asthma, or any other lung disease?  No    Has your doctor ordered any cardiac tests like echo, angiogram, stress test, ablation, or EKG, that you have not completed yet?  No    Do you  have a history of any heart conditions?  No     Have you ever had or are you waiting for an organ transplant?  No. Continue scheduling, no site restrictions.    Have you had a stroke or transient ischemic attack (TIA aka \"mini stroke\") in the last 2 years?   No.    Have you been diagnosed with or been told you have cirrhosis of the liver?   No.    Are you currently on dialysis?   No    Do you " "need assistance transferring?   No    BMI: Estimated body mass index is 26.68 kg/m  as calculated from the following:    Height as of 5/7/25: 1.613 m (5' 3.5\").    Weight as of 6/6/25: 69.4 kg (153 lb).     Is patients BMI > 40 and scheduling location [facility]?  No    Do you take an injectable or oral medication for weight loss or diabetes (excluding insulin)?  No    Do you take the medication Naltrexone?  No    Do you take blood thinners?  No       Prep   Are you currently on dialysis or do you have chronic kidney disease?  No    Do you have a diagnosis of diabetes?  No    Do you have a diagnosis of cystic fibrosis (CF)?  No    On a regular basis do you go 3 -5 days between bowel movements?  No    BMI > 40?  No    Preferred Pharmacy:    [id number] - [place] [address] AT [place] [address] & [address] [address]  Phone: [phone] Fax: [phone]      Final Scheduling Details     Procedure scheduled  Colonoscopy / Upper endoscopy (EGD)    Surgeon:  RAMON     Date of procedure:  8/25     Pre-OP / PAC:   No - Not required for this site.    Location  [facility] - [facility] - Patient preference.    Sedation   Moderate Sedation - Per order.      Patient Reminders:   You will receive a call from a Nurse to review instructions and health history.  This assessment must be completed prior to your procedure.  Failure to complete the Nurse assessment may result in the procedure being cancelled.      On the day of your procedure, please designate an adult(s) who can drive you home stay with you for the next 24 hours. The medicines used in the exam will make you sleepy. You will not be able to drive.      You cannot take public transportation, ride share services, or non-medical taxi service without a responsible caregiver.  Medical transport services are allowed with the requirement that a responsible caregiver will receive you at your destination.  We require that drivers " and caregivers are confirmed prior to your procedure.

## 2025-06-30 ENCOUNTER — OFFICE VISIT (OUTPATIENT)
Dept: FAMILY MEDICINE | Facility: CLINIC | Age: 55
End: 2025-06-30
Payer: COMMERCIAL

## 2025-06-30 VITALS
DIASTOLIC BLOOD PRESSURE: 90 MMHG | HEIGHT: 64 IN | HEART RATE: 107 BPM | WEIGHT: 159.5 LBS | RESPIRATION RATE: 16 BRPM | BODY MASS INDEX: 27.23 KG/M2 | SYSTOLIC BLOOD PRESSURE: 142 MMHG | OXYGEN SATURATION: 99 % | TEMPERATURE: 99.1 F

## 2025-06-30 DIAGNOSIS — G47.00 INSOMNIA, UNSPECIFIED TYPE: ICD-10-CM

## 2025-06-30 DIAGNOSIS — B49 FUNGAL INFECTION: ICD-10-CM

## 2025-06-30 DIAGNOSIS — I10 HTN (HYPERTENSION), BENIGN: ICD-10-CM

## 2025-06-30 DIAGNOSIS — K21.00 GASTROESOPHAGEAL REFLUX DISEASE WITH ESOPHAGITIS WITHOUT HEMORRHAGE: ICD-10-CM

## 2025-06-30 DIAGNOSIS — M25.551 HIP PAIN, RIGHT: ICD-10-CM

## 2025-06-30 DIAGNOSIS — F33.9 EPISODE OF RECURRENT MAJOR DEPRESSIVE DISORDER, UNSPECIFIED DEPRESSION EPISODE SEVERITY: ICD-10-CM

## 2025-06-30 DIAGNOSIS — R59.9 ENLARGED LYMPH NODES: ICD-10-CM

## 2025-06-30 DIAGNOSIS — F41.9 ANXIETY: ICD-10-CM

## 2025-06-30 DIAGNOSIS — M25.551 HIP PAIN, RIGHT: Primary | ICD-10-CM

## 2025-06-30 DIAGNOSIS — E78.5 HYPERLIPIDEMIA LDL GOAL <100: ICD-10-CM

## 2025-06-30 DIAGNOSIS — F90.9 ATTENTION DEFICIT HYPERACTIVITY DISORDER (ADHD), UNSPECIFIED ADHD TYPE: ICD-10-CM

## 2025-06-30 DIAGNOSIS — I10 HTN (HYPERTENSION), BENIGN: Primary | ICD-10-CM

## 2025-06-30 DIAGNOSIS — R63.2 INCREASED APPETITE: ICD-10-CM

## 2025-06-30 LAB
BASOPHILS # BLD AUTO: 0.1 10E3/UL (ref 0–0.2)
BASOPHILS NFR BLD AUTO: 1 %
EOSINOPHIL # BLD AUTO: 0.6 10E3/UL (ref 0–0.7)
EOSINOPHIL NFR BLD AUTO: 6 %
ERYTHROCYTE [DISTWIDTH] IN BLOOD BY AUTOMATED COUNT: 14.4 % (ref 10–15)
EST. AVERAGE GLUCOSE BLD GHB EST-MCNC: 88 MG/DL
HBA1C MFR BLD: 4.7 % (ref 0–5.6)
HCT VFR BLD AUTO: 38.7 % (ref 35–47)
HGB BLD-MCNC: 12.8 G/DL (ref 11.7–15.7)
IMM GRANULOCYTES # BLD: 0 10E3/UL
IMM GRANULOCYTES NFR BLD: 0 %
LYMPHOCYTES # BLD AUTO: 2.6 10E3/UL (ref 0.8–5.3)
LYMPHOCYTES NFR BLD AUTO: 25 %
MCH RBC QN AUTO: 29.5 PG (ref 26.5–33)
MCHC RBC AUTO-ENTMCNC: 33.1 G/DL (ref 31.5–36.5)
MCV RBC AUTO: 89 FL (ref 78–100)
MONOCYTES # BLD AUTO: 0.7 10E3/UL (ref 0–1.3)
MONOCYTES NFR BLD AUTO: 7 %
NEUTROPHILS # BLD AUTO: 6.2 10E3/UL (ref 1.6–8.3)
NEUTROPHILS NFR BLD AUTO: 61 %
PLATELET # BLD AUTO: 301 10E3/UL (ref 150–450)
RBC # BLD AUTO: 4.34 10E6/UL (ref 3.8–5.2)
WBC # BLD AUTO: 10.2 10E3/UL (ref 4–11)

## 2025-06-30 PROCEDURE — 84443 ASSAY THYROID STIM HORMONE: CPT

## 2025-06-30 PROCEDURE — 96127 BRIEF EMOTIONAL/BEHAV ASSMT: CPT

## 2025-06-30 PROCEDURE — 3080F DIAST BP >= 90 MM HG: CPT

## 2025-06-30 PROCEDURE — 80061 LIPID PANEL: CPT

## 2025-06-30 PROCEDURE — 36415 COLL VENOUS BLD VENIPUNCTURE: CPT

## 2025-06-30 PROCEDURE — 1125F AMNT PAIN NOTED PAIN PRSNT: CPT

## 2025-06-30 PROCEDURE — 99214 OFFICE O/P EST MOD 30 MIN: CPT

## 2025-06-30 PROCEDURE — 83036 HEMOGLOBIN GLYCOSYLATED A1C: CPT

## 2025-06-30 PROCEDURE — 3077F SYST BP >= 140 MM HG: CPT

## 2025-06-30 PROCEDURE — 85025 COMPLETE CBC W/AUTO DIFF WBC: CPT

## 2025-06-30 PROCEDURE — G2211 COMPLEX E/M VISIT ADD ON: HCPCS

## 2025-06-30 RX ORDER — ONDANSETRON 4 MG/1
4 TABLET, FILM COATED ORAL
COMMUNITY
Start: 2023-09-28

## 2025-06-30 RX ORDER — METFORMIN HYDROCHLORIDE 500 MG/1
TABLET, EXTENDED RELEASE ORAL
Qty: 53 TABLET | Refills: 0 | Status: SHIPPED | OUTPATIENT
Start: 2025-06-30 | End: 2025-07-30

## 2025-06-30 RX ORDER — DESVENLAFAXINE 100 MG/1
100 TABLET, EXTENDED RELEASE ORAL DAILY
Qty: 90 TABLET | Refills: 0 | Status: SHIPPED | OUTPATIENT
Start: 2025-06-30

## 2025-06-30 RX ORDER — HYDROCHLOROTHIAZIDE 12.5 MG/1
12.5 TABLET ORAL DAILY
Qty: 90 TABLET | Refills: 0 | Status: SHIPPED | OUTPATIENT
Start: 2025-06-30

## 2025-06-30 RX ORDER — OMEPRAZOLE 20 MG/1
20 CAPSULE, DELAYED RELEASE ORAL DAILY
Qty: 30 CAPSULE | Refills: 5 | Status: SHIPPED | OUTPATIENT
Start: 2025-06-30

## 2025-06-30 RX ORDER — NYSTATIN 100000 [USP'U]/G
POWDER TOPICAL 2 TIMES DAILY PRN
Qty: 60 G | Refills: 3 | Status: SHIPPED | OUTPATIENT
Start: 2025-06-30

## 2025-06-30 RX ORDER — DEXTROAMPHETAMINE SACCHARATE, AMPHETAMINE ASPARTATE, DEXTROAMPHETAMINE SULFATE, AND AMPHETAMINE SULFATE 7.5; 7.5; 7.5; 7.5 MG/1; MG/1; MG/1; MG/1
30 TABLET ORAL 2 TIMES DAILY
Qty: 60 TABLET | Refills: 0 | Status: SHIPPED | OUTPATIENT
Start: 2025-06-30

## 2025-06-30 RX ORDER — CLINDAMYCIN PHOSPHATE 10 UG/ML
1 LOTION TOPICAL
COMMUNITY
Start: 2023-09-25

## 2025-06-30 RX ORDER — ALPRAZOLAM 1 MG/1
1.5 TABLET ORAL 2 TIMES DAILY PRN
Qty: 60 TABLET | Refills: 0 | Status: SHIPPED | OUTPATIENT
Start: 2025-06-30

## 2025-06-30 ASSESSMENT — PAIN SCALES - GENERAL: PAINLEVEL_OUTOF10: MODERATE PAIN (4)

## 2025-06-30 NOTE — PROGRESS NOTES
{PROVIDER CHARTING PREFERENCE:771565}    Gold Yung is a 55 year old, presenting for the following health issues:  Hospital F/U (6/6/2025/[facility] /Bp, Rt Thigh leg pain symptoms going on for a couple years, possible enlarge lymph notes. Increased hunger. ) and Refill Request (Depression medication)        6/30/2025     4:13 PM   Additional Questions   Roomed by Jeimy   Accompanied by Self     HPI      Was in the ER. Eyes were blurry at work for three days. The day before going to the hospital her feet were tingling and hands were. Elevated BP in the ER. EKG was normal.     Surgery on the eyes was 5/7 she thinks. Eyes were dilated on exam, had been taking claritain D for 20 years. On pristiq, on adderall. Had run out of her Xanax and took Advil PM. Has tried Ambien, everything she can think of. She can not run out of the Xanax. She was on 1 mg per night. She at times needs 1 and a half tablets at night. Worry about son. Lately she has been doing chores and helping.     Psychiatrist at [facility], sounds like signed off.     Having thigh pain on the right for 2 years. [place] did a surgery where fatty tissue was removed (lipoma because it was pressing on a nerve). Had the pain at that time. Plastic Surgeon at the [place] sent them to the specialist for an US. Radiology saw it was fine, but the provider thought it was enlarged lymph nodes. Was still having pain, but in a different area. Pain is made worse with activity. Not so much pain with laying on it. Dull ache. Taking Tylenol for this at time. Worse at the end of the day. Running around on cement.     Tevia: Adderrall. ADHD is severe. Taking 30 mg BID IR. She likes the Tevia brand.     [place] Working like two peoples jobs.     Last period was January.     Libido: Patch has helped.     She has been so hungry lately. This is more of a change. This started about a month ago. Doesn't  "think it is the patch. Has been tired. Feels like at work \"I can not do this today\". She is never like this at work.     She tried topamax and did not find benefit with this. Has never been on hemoglobin.     Hospital Follow-up Visit:    Hospital/Nursing Home/IP Rehab Facility: [facility]  Most Recent Admission Date: 6/6/2025   Most Recent Admission Diagnosis:      Most Recent Discharge Date: 6/6/2025   Most Recent Discharge Diagnosis: Blurred vision - H53.8  Hypertension, unspecified type - I10  Other constipation - K59.09  Other insomnia - G47.09   Do you have any other stressors you would like to discuss with your provider? No    Problems taking medications regularly:  None  Medication changes since discharge: None  Problems adhering to non-medication therapy:  None    Summary of hospitalization:  [facility] discharge summary reviewed  Diagnostic Tests/Treatments reviewed.  Follow up needed: {:011554:}  Other Healthcare Providers Involved in Patient s Care:         {those currently involved after discharge:283994::\"None\"}  Update since discharge: {:020076} {TIP  Include information from family/caregivers, SNF, Care Coordination :764999}        Plan of care communicated with {:055776}     {Reference  Coding guidelines- Moderate Complexity F2F/Video within 7 - 14 days of discharge [id number], High Complexity F2F/Video within 7 days 8711153 or [id number] days [id number] :321498}      {additonal problems for provider to add (Optional):155006}  {additonal problems for provider to add (Optional):037937}    {ROS Picklists (Optional):417330}      Objective    BP (!) 142/90   Pulse 107   Temp 99.1  F (37.3  C) (Oral)   Resp 16   Ht 1.613 m (5' 3.5\")   Wt 72.3 kg (159 lb 8 oz)   LMP 01/01/2025 (Exact Date)   SpO2 99%   BMI 27.81 kg/m    Body mass index is 27.81 kg/m .  Physical Exam   {Exam List (Optional):404493}    {Diagnostic Test Results (Optional):939270}        Signed " Electronically by: Courtney Kinsey, ANA CNP  {Email feedback regarding this note to [email or web address]   :479914}   "    Having thigh pain on the right hip for 2 years. [place] did a surgery where fatty tissue was removed (lipoma because it was pressing on a nerve). Had the pain at that time. Plastic Surgeon at the [place] sent them to the specialist for an US. Radiology saw it was fine, but the provider thought it was enlarged lymph nodes. Was still having pain, but in a different area. Pain is made worse with activity. Not so much pain with laying on it. Dull ache. Taking Tylenol for this at time. Worse at the end of the day. Running around on cement.     Tevia: Adderrall. ADHD is severe. Taking 30 mg BID IR. She likes the Tevia brand.     Work [place] Working like two peoples jobs.     Last period was January.     Libido: Patch has helped.     She has been so hungry lately. This is more of a change. This started about a month ago. Doesn't think it is the patch. Has been tired. Feels like at work \"I can not do this today\". She is never like this at work usually very energetic.      She tried topamax and did not find benefit with this. Has never been on metformin.     Hospital Follow-up Visit:    Hospital/Nursing Home/IP Rehab Facility: [facility]  Most Recent Admission Date: 6/6/2025   Most Recent Admission Diagnosis:      Most Recent Discharge Date: 6/6/2025   Most Recent Discharge Diagnosis: Blurred vision - H53.8  Hypertension, unspecified type - I10  Other constipation - K59.09  Other insomnia - G47.09   Do you have any other stressors you would like to discuss with your provider? No    Problems taking medications regularly:  None  Medication changes since discharge: None  Problems adhering to non-medication therapy:  None    Summary of hospitalization:  [facility] discharge summary reviewed  Diagnostic Tests/Treatments reviewed.  Follow up needed: none  Other Healthcare Providers Involved in Patient s Care:         None  Update since discharge: stable. " "        Plan of care communicated with patient               Objective    BP (!) 142/90   Pulse 107   Temp 99.1  F (37.3  C) (Oral)   Resp 16   Ht 1.613 m (5' 3.5\")   Wt 72.3 kg (159 lb 8 oz)   LMP 01/01/2025 (Exact Date)   SpO2 99%   BMI 27.81 kg/m    Body mass index is 27.81 kg/m .  Physical Exam  Vitals reviewed.   Constitutional:       General: She is not in acute distress.  HENT:      Right Ear: Tympanic membrane, ear canal and external ear normal.      Left Ear: Tympanic membrane, ear canal and external ear normal.   Eyes:      Extraocular Movements: Extraocular movements intact.      Pupils: Pupils are equal, round, and reactive to light.   Neck:      Thyroid: No thyroid mass, thyromegaly or thyroid tenderness.   Cardiovascular:      Rate and Rhythm: Normal rate and regular rhythm.      Pulses:           Radial pulses are 2+ on the right side and 2+ on the left side.        Posterior tibial pulses are 2+ on the right side and 2+ on the left side.      Heart sounds: Normal heart sounds. No murmur heard.  Pulmonary:      Effort: Pulmonary effort is normal. No respiratory distress.      Breath sounds: No wheezing.   Abdominal:      General: Abdomen is flat. Bowel sounds are normal. There is no distension.   Musculoskeletal:         General: Normal range of motion.      Cervical back: Normal range of motion. No rigidity. Normal range of motion.      Right lower leg: No edema.      Left lower leg: No edema.   Lymphadenopathy:      Cervical: No cervical adenopathy.   Neurological:      General: No focal deficit present.      Mental Status: She is alert.      Cranial Nerves: No cranial nerve deficit.      Sensory: No sensory deficit.      Motor: No weakness.      Gait: Gait normal.   Psychiatric:         Mood and Affect: Mood normal.         Thought Content: Thought content normal.        At the end of the visit, I confirmed understanding of what was discussed. Aga has no further questions or concerns that " were brought up at this time.      I spent a total of 38 minutes on the day of the visit.   Time spent by me today doing chart review, history and exam, documentation and further activities per the note     The longitudinal plan of care for the diagnosis(es)/condition(s) as documented were addressed during this visit. Due to the added complexity in care, I will continue to support Aga in the subsequent management and with ongoing continuity of care.     Courtney Kinsey DNP, APRN, FNP-C

## 2025-07-01 ENCOUNTER — PATIENT OUTREACH (OUTPATIENT)
Dept: CARE COORDINATION | Facility: CLINIC | Age: 55
End: 2025-07-01
Payer: COMMERCIAL

## 2025-07-01 LAB
CHOLEST SERPL-MCNC: 228 MG/DL
FASTING STATUS PATIENT QL REPORTED: NO
HDLC SERPL-MCNC: 65 MG/DL
LDLC SERPL CALC-MCNC: 142 MG/DL
NONHDLC SERPL-MCNC: 163 MG/DL
TRIGL SERPL-MCNC: 103 MG/DL
TSH SERPL DL<=0.005 MIU/L-ACNC: 1.46 UIU/ML (ref 0.3–4.2)

## 2025-07-02 ENCOUNTER — NURSE TRIAGE (OUTPATIENT)
Dept: FAMILY MEDICINE | Facility: CLINIC | Age: 55
End: 2025-07-02
Payer: COMMERCIAL

## 2025-07-02 ENCOUNTER — RESULTS FOLLOW-UP (OUTPATIENT)
Dept: FAMILY MEDICINE | Facility: CLINIC | Age: 55
End: 2025-07-02

## 2025-07-03 ENCOUNTER — PATIENT OUTREACH (OUTPATIENT)
Dept: CARE COORDINATION | Facility: CLINIC | Age: 55
End: 2025-07-03
Payer: COMMERCIAL

## 2025-07-03 NOTE — TELEPHONE ENCOUNTER
Attempted to call pt, no answer (1/3). Left message requesting pt to call back clinic. If pt calls back, please triage current nausea symptoms (or any other concerns) and need for Zofran.

## 2025-07-05 ENCOUNTER — E-VISIT (OUTPATIENT)
Dept: URGENT CARE | Facility: CLINIC | Age: 55
End: 2025-07-05
Payer: COMMERCIAL

## 2025-07-05 DIAGNOSIS — R11.0 NAUSEA: Primary | ICD-10-CM

## 2025-07-05 PROCEDURE — 99207 PR NON-BILLABLE SERV PER CHARTING: CPT | Performed by: NURSE PRACTITIONER

## 2025-07-06 NOTE — PATIENT INSTRUCTIONS
Dear Aga Savage,    We are sorry you are not feeling well. Based on the responses you provided, it is recommended that you be seen in-person in urgent care so we can better evaluate your symptoms. Please click here to find the nearest urgent care location to you.   You will not be charged for this Visit. Thank you for trusting us with your care.    Elsy Stephenson, CNP    You can make a virtual urgent care visit for tomorrow.

## 2025-07-09 ASSESSMENT — PATIENT HEALTH QUESTIONNAIRE - PHQ9
SUM OF ALL RESPONSES TO PHQ QUESTIONS 1-9: 9
SUM OF ALL RESPONSES TO PHQ QUESTIONS 1-9: 9
10. IF YOU CHECKED OFF ANY PROBLEMS, HOW DIFFICULT HAVE THESE PROBLEMS MADE IT FOR YOU TO DO YOUR WORK, TAKE CARE OF THINGS AT HOME, OR GET ALONG WITH OTHER PEOPLE: SOMEWHAT DIFFICULT

## 2025-07-10 ENCOUNTER — VIRTUAL VISIT (OUTPATIENT)
Dept: FAMILY MEDICINE | Facility: CLINIC | Age: 55
End: 2025-07-10
Payer: COMMERCIAL

## 2025-07-10 DIAGNOSIS — F41.1 GAD (GENERALIZED ANXIETY DISORDER): Primary | ICD-10-CM

## 2025-07-10 NOTE — LETTER
[facility]  [address]  [place] [address]  Phone: [phone]    July 10, 2025        Aga Savage  [address] [address]          To whom it may concern:    RE: Aga Savage    Patient was seen and evaluated today by our clinic. She missed work on 7/9/25 due to a family medical concern. Please excuse her absence.     Please contact me for questions or concerns.      Sincerely,    Cynthia Sandoval PA-C          
[phone]
Him/He

## 2025-07-10 NOTE — PROGRESS NOTES
Aga is a 55 year old who is being evaluated via a billable video visit.    How would you like to obtain your AVS? [employer]  If the video visit is dropped, the invitation should be resent by: Text to cell phone: [phone]  Will anyone else be joining your video visit? No      Assessment & Plan       ICD-10-CM    1. FAMILIA (generalized anxiety disorder)  F41.1           Her employer is requiring a medical note/letter for her absence yesterday. She missed work due to her son's medical concern. Note sent to [employer] for her.      Return if symptoms worsen or fail to improve.       Subjective   Aga is a 55 year old, presenting for the following health issues:  Anxiety and Depression        7/10/2025     6:47 AM   Additional Questions   Roomed by Patient echecked in via [employer]   Accompanied by na         7/10/2025     6:47 AM   Patient Reported Additional Medications   Patient reports taking the following new medications na     History of Present Illness       Mental Health Follow-up:  Patient presents to follow-up on Depression & Anxiety.Patient's depression since last visit has been:  Medium  The patient is not having other symptoms associated with depression.  Patient's anxiety since last visit has been:  Medium  The patient is having other symptoms associated with anxiety.  Any significant life events: relationship concerns (job concern)  Patient is feeling anxious or having panic attacks.  Patient has no concerns about alcohol or drug use.         Was seen on 6/30 for anxiety and ADHD  Yesterday morning son called her (hx of schizophrenia) and was freaking out  Called out of work,  at [employer]  Son's father is not involved  Son had been off meds x1 week  Work is asking for a note or documentation for missing work  Even got her shift covered, so she doesn't know why it's a problem    Recently took PTO for ptosis surgery    Now on med for HTN  Feels like she's had more energy  Thought hydrochlorothiazide made  her sleepy, so may try taking it at night        Review of Systems  Constitutional, and psychiatric systems are negative, except as otherwise noted.      Objective           Vitals:  No vitals were obtained today due to virtual visit.    Physical Exam   GENERAL: alert and no distress  EYES: Eyes grossly normal to inspection.  No discharge or erythema, or obvious scleral/conjunctival abnormalities.  RESP: No audible wheeze, cough, or visible cyanosis.    SKIN: Visible skin clear. No significant rash, abnormal pigmentation or lesions.  NEURO: Cranial nerves grossly intact.  Mentation and speech appropriate for age.  PSYCH: Appropriate affect, tone, and pace of words        Video-Visit Details    Type of service:  Video Visit   Originating Location (pt. Location): Home    Distant Location (provider location):  Off-site  Platform used for Video Visit: Reginaldo  Signed Electronically by: Cynthia Sandoval PA-C

## 2025-07-21 DIAGNOSIS — N95.1 SYMPTOMATIC MENOPAUSAL OR FEMALE CLIMACTERIC STATES: ICD-10-CM

## 2025-07-30 ENCOUNTER — PATIENT OUTREACH (OUTPATIENT)
Dept: CARE COORDINATION | Facility: CLINIC | Age: 55
End: 2025-07-30
Payer: COMMERCIAL

## 2025-08-02 SDOH — HEALTH STABILITY: PHYSICAL HEALTH: ON AVERAGE, HOW MANY MINUTES DO YOU ENGAGE IN EXERCISE AT THIS LEVEL?: 60 MIN

## 2025-08-02 SDOH — HEALTH STABILITY: PHYSICAL HEALTH: ON AVERAGE, HOW MANY DAYS PER WEEK DO YOU ENGAGE IN MODERATE TO STRENUOUS EXERCISE (LIKE A BRISK WALK)?: 4 DAYS

## 2025-08-02 ASSESSMENT — SOCIAL DETERMINANTS OF HEALTH (SDOH): HOW OFTEN DO YOU GET TOGETHER WITH FRIENDS OR RELATIVES?: ONCE A WEEK

## 2025-08-04 ENCOUNTER — OFFICE VISIT (OUTPATIENT)
Dept: FAMILY MEDICINE | Facility: CLINIC | Age: 55
End: 2025-08-04
Payer: COMMERCIAL

## 2025-08-04 VITALS
OXYGEN SATURATION: 99 % | DIASTOLIC BLOOD PRESSURE: 82 MMHG | TEMPERATURE: 98.2 F | HEART RATE: 88 BPM | SYSTOLIC BLOOD PRESSURE: 120 MMHG | RESPIRATION RATE: 20 BRPM | WEIGHT: 147.8 LBS | BODY MASS INDEX: 26.19 KG/M2 | HEIGHT: 63 IN

## 2025-08-04 DIAGNOSIS — F33.9 EPISODE OF RECURRENT MAJOR DEPRESSIVE DISORDER, UNSPECIFIED DEPRESSION EPISODE SEVERITY: ICD-10-CM

## 2025-08-04 DIAGNOSIS — F90.9 ATTENTION DEFICIT HYPERACTIVITY DISORDER (ADHD), UNSPECIFIED ADHD TYPE: ICD-10-CM

## 2025-08-04 DIAGNOSIS — I10 HTN (HYPERTENSION), BENIGN: ICD-10-CM

## 2025-08-04 DIAGNOSIS — Z79.890 ON HORMONE REPLACEMENT THERAPY: ICD-10-CM

## 2025-08-04 DIAGNOSIS — K21.9 GASTROESOPHAGEAL REFLUX DISEASE WITHOUT ESOPHAGITIS: ICD-10-CM

## 2025-08-04 DIAGNOSIS — G47.00 INSOMNIA, UNSPECIFIED TYPE: ICD-10-CM

## 2025-08-04 DIAGNOSIS — F41.9 ANXIETY: ICD-10-CM

## 2025-08-04 DIAGNOSIS — Z00.00 ROUTINE GENERAL MEDICAL EXAMINATION AT A HEALTH CARE FACILITY: Primary | ICD-10-CM

## 2025-08-04 PROCEDURE — 3079F DIAST BP 80-89 MM HG: CPT

## 2025-08-04 PROCEDURE — 36415 COLL VENOUS BLD VENIPUNCTURE: CPT

## 2025-08-04 PROCEDURE — 99396 PREV VISIT EST AGE 40-64: CPT | Mod: 25

## 2025-08-04 PROCEDURE — 80048 BASIC METABOLIC PNL TOTAL CA: CPT

## 2025-08-04 PROCEDURE — G2211 COMPLEX E/M VISIT ADD ON: HCPCS

## 2025-08-04 PROCEDURE — 99214 OFFICE O/P EST MOD 30 MIN: CPT | Mod: 25

## 2025-08-04 PROCEDURE — 3074F SYST BP LT 130 MM HG: CPT

## 2025-08-04 RX ORDER — ALPRAZOLAM 1 MG/1
1.5 TABLET ORAL 2 TIMES DAILY PRN
Qty: 60 TABLET | Refills: 0 | Status: SHIPPED | OUTPATIENT
Start: 2025-08-04

## 2025-08-04 ASSESSMENT — PATIENT HEALTH QUESTIONNAIRE - PHQ9
SUM OF ALL RESPONSES TO PHQ QUESTIONS 1-9: 7
SUM OF ALL RESPONSES TO PHQ QUESTIONS 1-9: 7
10. IF YOU CHECKED OFF ANY PROBLEMS, HOW DIFFICULT HAVE THESE PROBLEMS MADE IT FOR YOU TO DO YOUR WORK, TAKE CARE OF THINGS AT HOME, OR GET ALONG WITH OTHER PEOPLE: SOMEWHAT DIFFICULT

## 2025-08-05 ENCOUNTER — PATIENT OUTREACH (OUTPATIENT)
Dept: CARE COORDINATION | Facility: CLINIC | Age: 55
End: 2025-08-05
Payer: COMMERCIAL

## 2025-08-05 LAB
ANION GAP SERPL CALCULATED.3IONS-SCNC: 12 MMOL/L (ref 7–15)
BUN SERPL-MCNC: 11.8 MG/DL (ref 6–20)
CALCIUM SERPL-MCNC: 9.5 MG/DL (ref 8.8–10.4)
CHLORIDE SERPL-SCNC: 97 MMOL/L (ref 98–107)
CREAT SERPL-MCNC: 0.83 MG/DL (ref 0.51–0.95)
EGFRCR SERPLBLD CKD-EPI 2021: 84 ML/MIN/1.73M2
GLUCOSE SERPL-MCNC: 116 MG/DL (ref 70–99)
HCO3 SERPL-SCNC: 26 MMOL/L (ref 22–29)
POTASSIUM SERPL-SCNC: 3.6 MMOL/L (ref 3.4–5.3)
SODIUM SERPL-SCNC: 135 MMOL/L (ref 135–145)

## 2025-08-07 ENCOUNTER — TELEPHONE (OUTPATIENT)
Dept: GASTROENTEROLOGY | Facility: CLINIC | Age: 55
End: 2025-08-07
Payer: COMMERCIAL

## 2025-08-09 ENCOUNTER — MYC MEDICAL ADVICE (OUTPATIENT)
Dept: FAMILY MEDICINE | Facility: CLINIC | Age: 55
End: 2025-08-09
Payer: COMMERCIAL

## 2025-08-11 ENCOUNTER — TELEPHONE (OUTPATIENT)
Dept: GASTROENTEROLOGY | Facility: CLINIC | Age: 55
End: 2025-08-11

## 2025-08-15 ENCOUNTER — ANCILLARY PROCEDURE (OUTPATIENT)
Dept: GENERAL RADIOLOGY | Facility: CLINIC | Age: 55
End: 2025-08-15
Payer: COMMERCIAL

## 2025-08-15 DIAGNOSIS — M25.551 HIP PAIN, RIGHT: ICD-10-CM

## 2025-08-15 PROCEDURE — 73502 X-RAY EXAM HIP UNI 2-3 VIEWS: CPT | Mod: TC | Performed by: RADIOLOGY

## 2025-08-17 ENCOUNTER — MYC MEDICAL ADVICE (OUTPATIENT)
Dept: FAMILY MEDICINE | Facility: CLINIC | Age: 55
End: 2025-08-17
Payer: COMMERCIAL

## 2025-08-18 ENCOUNTER — PATIENT OUTREACH (OUTPATIENT)
Dept: CARE COORDINATION | Facility: CLINIC | Age: 55
End: 2025-08-18
Payer: COMMERCIAL

## 2025-08-20 ENCOUNTER — PATIENT OUTREACH (OUTPATIENT)
Dept: CARE COORDINATION | Facility: CLINIC | Age: 55
End: 2025-08-20
Payer: COMMERCIAL

## 2025-09-01 ENCOUNTER — MYC REFILL (OUTPATIENT)
Dept: FAMILY MEDICINE | Facility: CLINIC | Age: 55
End: 2025-09-01
Payer: COMMERCIAL

## 2025-09-01 DIAGNOSIS — G47.00 INSOMNIA, UNSPECIFIED TYPE: ICD-10-CM

## 2025-09-01 DIAGNOSIS — F90.9 ATTENTION DEFICIT HYPERACTIVITY DISORDER (ADHD), UNSPECIFIED ADHD TYPE: ICD-10-CM

## 2025-09-01 DIAGNOSIS — F41.9 ANXIETY: ICD-10-CM

## 2025-09-03 ENCOUNTER — DOCUMENTATION ONLY (OUTPATIENT)
Dept: FAMILY MEDICINE | Facility: CLINIC | Age: 55
End: 2025-09-03
Payer: COMMERCIAL

## 2025-09-03 RX ORDER — DEXTROAMPHETAMINE SACCHARATE, AMPHETAMINE ASPARTATE, DEXTROAMPHETAMINE SULFATE AND AMPHETAMINE SULFATE 7.5; 7.5; 7.5; 7.5 MG/1; MG/1; MG/1; MG/1
30 TABLET ORAL 2 TIMES DAILY
Qty: 60 TABLET | Refills: 0 | Status: SHIPPED | OUTPATIENT
Start: 2025-09-03

## 2025-09-03 RX ORDER — ALPRAZOLAM 1 MG/1
1.5 TABLET ORAL 2 TIMES DAILY PRN
Qty: 60 TABLET | Refills: 0 | Status: SHIPPED | OUTPATIENT
Start: 2025-09-03

## (undated) DEVICE — SU SILK 4-0 RB-1 30" K871H

## (undated) DEVICE — PACK MINOR EYE CUSTOM ASC SEY15MEUMH

## (undated) DEVICE — LINEN TOWEL PACK X5 5464

## (undated) DEVICE — SU PLAIN 6-0 G-1DA 18" 770G

## (undated) DEVICE — GLOVE BIOGEL PI MICRO SZ 7.5 48575

## (undated) DEVICE — DRSG GAUZE 4X4" 3033

## (undated) DEVICE — SUCTION TIP YANKAUER STR K87

## (undated) DEVICE — SOL WATER IRRIG 500ML BOTTLE 2F7113

## (undated) DEVICE — ESU NDL COLORADO MICRO 3CM STR N103A

## (undated) DEVICE — ESU PENCIL SMOKE EVAC W/ROCKER SWITCH 0703-047-000

## (undated) DEVICE — EYE PREP BETADINE 5% SOLUTION 30ML 0065-0411-30

## (undated) DEVICE — TUBING SUCTION 10'X3/16" N510

## (undated) DEVICE — ESU GROUND PAD ADULT W/CORD E7507

## (undated) DEVICE — ESU HIGH TEMP LOOP TIP AA03

## (undated) RX ORDER — OXYCODONE HYDROCHLORIDE 5 MG/1
TABLET ORAL
Status: DISPENSED
Start: 2025-05-07

## (undated) RX ORDER — ACETAMINOPHEN 325 MG/1
TABLET ORAL
Status: DISPENSED
Start: 2025-05-07

## (undated) RX ORDER — TRANEXAMIC ACID 100 MG/ML
INJECTION, SOLUTION INTRAVENOUS
Status: DISPENSED
Start: 2025-05-07

## (undated) RX ORDER — PROPOFOL 10 MG/ML
INJECTION, EMULSION INTRAVENOUS
Status: DISPENSED
Start: 2025-05-07